# Patient Record
Sex: FEMALE | Race: WHITE | NOT HISPANIC OR LATINO | Employment: OTHER | URBAN - METROPOLITAN AREA
[De-identification: names, ages, dates, MRNs, and addresses within clinical notes are randomized per-mention and may not be internally consistent; named-entity substitution may affect disease eponyms.]

---

## 2017-05-01 DIAGNOSIS — F32.A DEPRESSION: ICD-10-CM

## 2017-05-02 NOTE — TELEPHONE ENCOUNTER
sertraline (ZOLOFT) 50 MG tablet      Last Written Prescription Date:  12/5/16  Last Fill Quantity: 30,   # refills: 0//  Last Office Visit with [facility] primary care provider:  4/8/16  Future Office visit: none    Routing refill request to provider for review/approval because:  Refill denied. Needs appointment

## 2017-07-14 ENCOUNTER — OFFICE VISIT (OUTPATIENT)
Dept: OBGYN | Facility: CLINIC | Age: 37
End: 2017-07-14
Payer: COMMERCIAL

## 2017-07-14 VITALS
WEIGHT: 146.8 LBS | DIASTOLIC BLOOD PRESSURE: 68 MMHG | BODY MASS INDEX: 22.25 KG/M2 | SYSTOLIC BLOOD PRESSURE: 108 MMHG | HEIGHT: 68 IN

## 2017-07-14 DIAGNOSIS — F41.9 ANXIETY: Primary | ICD-10-CM

## 2017-07-14 PROCEDURE — 99214 OFFICE O/P EST MOD 30 MIN: CPT | Performed by: OBSTETRICS & GYNECOLOGY

## 2017-07-14 RX ORDER — COPPER 313.4 MG/1
1 INTRAUTERINE DEVICE INTRAUTERINE ONCE
COMMUNITY
End: 2019-01-30

## 2017-07-14 ASSESSMENT — ANXIETY QUESTIONNAIRES
6. BECOMING EASILY ANNOYED OR IRRITABLE: SEVERAL DAYS
IF YOU CHECKED OFF ANY PROBLEMS ON THIS QUESTIONNAIRE, HOW DIFFICULT HAVE THESE PROBLEMS MADE IT FOR YOU TO DO YOUR WORK, TAKE CARE OF THINGS AT HOME, OR GET ALONG WITH OTHER PEOPLE: SOMEWHAT DIFFICULT
1. FEELING NERVOUS, ANXIOUS, OR ON EDGE: SEVERAL DAYS
7. FEELING AFRAID AS IF SOMETHING AWFUL MIGHT HAPPEN: SEVERAL DAYS
GAD7 TOTAL SCORE: 6
5. BEING SO RESTLESS THAT IT IS HARD TO SIT STILL: NOT AT ALL
3. WORRYING TOO MUCH ABOUT DIFFERENT THINGS: SEVERAL DAYS
2. NOT BEING ABLE TO STOP OR CONTROL WORRYING: SEVERAL DAYS

## 2017-07-14 ASSESSMENT — PATIENT HEALTH QUESTIONNAIRE - PHQ9: 5. POOR APPETITE OR OVEREATING: SEVERAL DAYS

## 2017-07-14 NOTE — MR AVS SNAPSHOT
"              After Visit Summary   2017    More Herrera    MRN: [id number]           Patient Information     Date Of Birth          1980        Visit Information        Provider Department      2017 8:20 AM Omeag Maria MD [facility] [place]        Today's Diagnoses     Anxiety    -  1       Follow-ups after your visit        Who to contact     If you have questions or need follow up information about today's clinic visit or your schedule please contact [facility] directly at [phone].  Normal or non-critical lab and imaging results will be communicated to you by [employer], letter or phone within 4 business days after the clinic has received the results. If you do not hear from us within 7 days, please contact the clinic through [employer] or phone. If you have a critical or abnormal lab result, we will notify you by phone as soon as possible.  Submit refill requests through depict or call your pharmacy and they will forward the refill request to us. Please allow 3 business days for your refill to be completed.          Additional Information About Your Visit        MyChart Information     depict lets you send messages to your doctor, view your test results, renew your prescriptions, schedule appointments and more. To sign up, go to [email or web address] . Click on \"Log in\" on the left side of the screen, which will take you to the Welcome page. Then click on \"Sign up Now\" on the right side of the page.     You will be asked to enter the access code listed below, as well as some personal information. Please follow the directions to create your username and password.     Your access code is: [email or web address]  Expires: 10/12/2017  9:31 AM     Your access code will  in 90 days. If you need help or a new code, please call your [facility] or [phone].        Care EveryWhere ID     This is your Care EveryWhere ID. This could be used by other " "organizations to access your [place] medical records  [id number]        Your Vitals Were     Height Last Period BMI (Body Mass Index)             5' 8\" (1.727 m) 06/30/2017 (Approximate) 22.32 kg/m2          Blood Pressure from Last 3 Encounters:   07/14/17 108/68   04/08/16 102/70   09/08/15 110/68    Weight from Last 3 Encounters:   07/14/17 146 lb 12.8 oz (66.6 kg)   04/08/16 153 lb (69.4 kg)   09/08/15 161 lb (73 kg)              Today, you had the following     No orders found for display         Today's Medication Changes          These changes are accurate as of: 7/14/17  9:31 AM.  If you have any questions, ask your nurse or doctor.               These medicines have changed or have updated prescriptions.        Dose/Directions    * sertraline 50 MG tablet   Commonly known as:  ZOLOFT   This may have changed:  Another medication with the same name was added. Make sure you understand how and when to take each.   Used for:  Depression   Changed by:  Omega Maria MD        TAKE 1 TO 2 TABLETS BY MOUTH DAILY.   Quantity:  30 tablet   Refills:  0       * sertraline 50 MG tablet   Commonly known as:  ZOLOFT   This may have changed:  You were already taking a medication with the same name, and this prescription was added. Make sure you understand how and when to take each.   Used for:  Anxiety   Changed by:  Omega Maria MD        Dose:  50 mg   Take 1 tablet (50 mg) by mouth daily   Quantity:  30 tablet   Refills:  1       * Notice:  This list has 2 medication(s) that are the same as other medications prescribed for you. Read the directions carefully, and ask your doctor or other care provider to review them with you.         Where to get your medicines      These medications were sent to [facility] [address] - [address] & [place] [address] [place] [address]     Phone:  [phone]     sertraline 50 MG tablet                Primary " Care Provider Office Phone # Fax #    Omega Maria [phone]       [facility] [address] [place] [address]        Equal Access to Services     JODY WAITE : Hadii aad ku hadmelvino Sonatalieali, waaxda luqadaha, qaybta kaalmada adeegyada, justus carranzalibby brown gracejenniffer fitch. So [place] [phone].    ATENCIÓN: Si habla español, tiene a williamson disposición servicios gratuitos de asistencia lingüística. Llame al [phone].    We comply with applicable federal civil rights laws and [place] laws. We do not discriminate on the basis of race, color, national origin, age, disability sex, sexual orientation or gender identity.            Thank you!     Thank you for choosing [facility]  for your care. Our goal is always to provide you with excellent care. Hearing back from our patients is one way we can continue to improve our services. Please take a few minutes to complete the written survey that you may receive in the mail after your visit with us. Thank you!             Your Updated Medication List - Protect others around you: Learn how to safely use, store and throw away your medicines at www.disposemymeds.org.          This list is accurate as of: 7/14/17  9:31 AM.  Always use your most recent med list.                   Brand Name Dispense Instructions for use Diagnosis    CONCERTA PO           paragard intrauterine copper      1 each by Intrauterine route once        PRENATAL VITAMINS PO      Take 1 capsule by mouth.        * sertraline 50 MG tablet    ZOLOFT    30 tablet    TAKE 1 TO 2 TABLETS BY MOUTH DAILY.    Depression       * sertraline 50 MG tablet    ZOLOFT    30 tablet    Take 1 tablet (50 mg) by mouth daily    Anxiety       * Notice:  This list has 2 medication(s) that are the same as other medications prescribed for you. Read the directions carefully, and ask your doctor or other care provider to review them with you.

## 2017-07-14 NOTE — PROGRESS NOTES
SUBJECTIVE:                                                   More Herrera is a 36 year old female who presents to clinic today for the following health issue(s):  Patient presents with:  Recheck Medication: here to get refill on zoloft. states she ran out about 6 months ago. states medications were helping when she needed them      HPI:  Hx of depression and anxiety in past. Has taken Zoloft in past with no side effects. Pt feeling more anxiety and difficulty concentrating. Sleeping well. Does take Ambien prn.   Most of issues seem to be coming from her 's stress. Having issues with work. He is ER MD. Sounds like a possible lawsuit.  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 Feels she wants to treat before she gets much worse.  Has taken 50-100mg in past. Usually weaned up with 25mg to reduce side effects.      Patient's last menstrual period was 2017 (approximate)..   Patient is sexually active, .  Using IUD for contraception.    reports that she has never smoked. She does not have any smokeless tobacco history on file.    STD testing offered?  Declined    Health maintenance updated:  yes    Today's PHQ-2 Score:   PHQ-2 (  [employer]) 2016   Q1: Little interest or pleasure in doing things 0   Q2: Feeling down, depressed or hopeless 0   PHQ-2 Score 0     Today's PHQ-9 Score:   PHQ-9 SCORE 2017   Total Score 5     Today's ALYSSA-7 Score:   ALYSSA-7 SCORE 2017   Total Score 6       Problem list and histories reviewed & adjusted, as indicated.  Additional history: as documented.    Patient Active Problem List   Diagnosis     Liveborn infant     Labor and delivery, indication for care     Past Surgical History:   Procedure Laterality Date     DILATION AND CURETTAGE SUCTION  2012    Procedure: DILATION AND CURETTAGE SUCTION;  VACUUM DILATION AND CURETTAGE ;  Surgeon: Omega Maria MD;  Location: [facility]     MAMMOPLASTY REDUCTION BILATERAL           "  Social History   Substance Use Topics     Smoking status: Never Smoker     Smokeless tobacco: Not on file     Alcohol use 0.5 oz/week     1 Glasses of wine per week      Comment: 1-2 drinks 2-3X per week      Problem (# of Occurrences) Relation (Name,Age of Onset)    Breast Cancer (2) Paternal Grandmother, Paternal Aunt: 4 paternal cousins at young age. Pt reports thery were BrCA gene neg.     Hyperlipidemia (3) Mother, Father, Brother            Current Outpatient Prescriptions   Medication Sig     Methylphenidate HCl (CONCERTA PO)      paragard intrauterine copper 1 each by Intrauterine route once     sertraline (ZOLOFT) 50 MG tablet Take 1 tablet (50 mg) by mouth daily     sertraline (ZOLOFT) 50 MG tablet TAKE 1 TO 2 TABLETS BY MOUTH DAILY.     PRENATAL VITAMINS PO Take 1 capsule by mouth.     No current facility-administered medications for this visit.      Facility-Administered Medications Ordered in Other Visits   Medication     ROPivacaine (NAROPIN) injection     No Known Allergies    ROS:  12 point review of systems negative other than symptoms noted below.  Psychiatric: feeling off    OBJECTIVE:     /68  Ht 5' 8\" (1.727 m)  Wt 146 lb 12.8 oz (66.6 kg)  LMP 06/30/2017 (Approximate)  BMI 22.32 kg/m2  Body mass index is 22.32 kg/(m^2).    Exam:  Constitutional:  Appearance: Well nourished, well developed alert, in no acute distress  Neurologic/Psychiatric:  Mental Status:  Oriented X3      In-Clinic Test Results:  No results found for this or any previous visit (from the past 24 hour(s)).    ASSESSMENT/PLAN:                                                        ICD-10-CM    1. Anxiety F41.9 sertraline (ZOLOFT) 50 MG tablet         Discussed sleep and use of OTC meds as well as Ambien.  Discussed Zoloft and expected outcomes. Won't change her life, just how she reacts to it. Her symptoms are mostly anxiety so 50mg should help. Gave 60 pills. Will call for refill and do PHQ-9 on phone.  Discussed " side effects. May wean off end of summer if things get better at home.    25 minutes was spent face to face with the patient today discussing her history, diagnosis, and follow-up plan as noted above. Over 50% of the visit was spent in counseling and coordination of care.    Total Visit Time: 25 minutes.       Omega Maria MD  [facility]

## 2017-07-15 ASSESSMENT — PATIENT HEALTH QUESTIONNAIRE - PHQ9: SUM OF ALL RESPONSES TO PHQ QUESTIONS 1-9: 5

## 2017-07-15 ASSESSMENT — ANXIETY QUESTIONNAIRES: GAD7 TOTAL SCORE: 6

## 2017-09-23 ENCOUNTER — HEALTH MAINTENANCE LETTER (OUTPATIENT)
Age: 37
End: 2017-09-23

## 2017-10-05 ENCOUNTER — TRANSFERRED RECORDS (OUTPATIENT)
Dept: HEALTH INFORMATION MANAGEMENT | Facility: CLINIC | Age: 37
End: 2017-10-05

## 2018-01-25 DIAGNOSIS — F41.9 ANXIETY: ICD-10-CM

## 2018-01-25 NOTE — TELEPHONE ENCOUNTER
"SERTRALINE 50      Last Written Prescription Date:  9/26/17  Last Fill Quantity: 180,   # refills: 0  Last Office Visit: 9/8/15-PP VISIT, 4/8/16-pre-op, 7/14/17-anxiety appt med check  Future Office visit:       POC note 7/14/17: \"Discussed Zoloft and expected outcomes. Won't change her life, just how she reacts to it. Her symptoms are mostly anxiety so 50mg should help. Gave 60 pills. Will call for refill and do PHQ-9 on phone.\"  Refill encounter 9/25/17: \"Note routed to Dr. Maria ok to increase dose for pt? How much for refill? Follow up needed?- no notes about when pt would be due for follow up for this medication and her dose was increased at that time from 50mg-75mg.       LMTCB to discuss how increased dose is working for her and to perform GAD7 and PHQ9.   "

## 2018-01-31 NOTE — TELEPHONE ENCOUNTER
Received call from [employer]. Informed we need to talk to pt prior to sending refill. Called pt and LMTCB so that PHQ-9 and ALYSSA-7 could be done. Also disscuss what dose she is taking.

## 2018-12-19 ENCOUNTER — OFFICE VISIT (OUTPATIENT)
Dept: OBGYN | Facility: CLINIC | Age: 38
End: 2018-12-19
Payer: COMMERCIAL

## 2018-12-19 VITALS
WEIGHT: 146 LBS | DIASTOLIC BLOOD PRESSURE: 70 MMHG | HEART RATE: 72 BPM | BODY MASS INDEX: 22.13 KG/M2 | SYSTOLIC BLOOD PRESSURE: 134 MMHG | HEIGHT: 68 IN

## 2018-12-19 DIAGNOSIS — Z71.1 PERSON WITH FEARED COMPLAINT IN WHOM NO DIAGNOSIS WAS MADE: Primary | ICD-10-CM

## 2018-12-19 PROCEDURE — 99212 OFFICE O/P EST SF 10 MIN: CPT | Performed by: NURSE PRACTITIONER

## 2018-12-19 ASSESSMENT — MIFFLIN-ST. JEOR: SCORE: 1390.75

## 2018-12-19 NOTE — PROGRESS NOTES
SUBJECTIVE:                                                   More Herrera is a 38 year old female who presents to clinic today for the following health issue(s):  Patient presents with:  Rectal Problem: c/o possible hemorrhoid or skin tag external w/ some rectal bleeding; has had one internally      HPI:  Pt here today for evaluation of a possible hemorrhoid or skin tag. She is not having any symptoms at this time.     Pt states that with the birth of her last child, in , she pushed for 4 hours and tore. She did have stiches.     She has not had an annual exam since her last pregnancy. Family hx of breast cancer on her fathers side. Last pap in , NIL.    Patient's last menstrual period was 2018 (approximate)..   Patient is sexually active, .  Using IUD for contraception.    reports that  has never smoked. she has never used smokeless tobacco.    STD testing offered?  Declined    Health maintenance updated:  yes      Problem list and histories reviewed & adjusted, as indicated.  Additional history: as documented.    Patient Active Problem List   Diagnosis     Liveborn infant     Labor and delivery, indication for care     Past Surgical History:   Procedure Laterality Date     DILATION AND CURETTAGE SUCTION  2012    Procedure: DILATION AND CURETTAGE SUCTION;  VACUUM DILATION AND CURETTAGE ;  Surgeon: Omega Maria MD;  Location: [facility]     MAMMOPLASTY REDUCTION BILATERAL            Social History     Tobacco Use     Smoking status: Never Smoker     Smokeless tobacco: Never Used   Substance Use Topics     Alcohol use: Yes     Alcohol/week: 0.5 oz     Types: 1 Glasses of wine per week     Comment: 1-2 drinks 2-3X per week      Problem (# of Occurrences) Relation (Name,Age of Onset)    Breast Cancer (2) Paternal Grandmother, Paternal Aunt: 4 paternal cousins at young age. Pt reports thery were BrCA gene neg.     Hyperlipidemia (3) Mother, Father, Brother         "    Current Outpatient Medications   Medication Sig     Methylphenidate HCl (CONCERTA PO) Take 27 mg by mouth      paragard intrauterine copper 1 each by Intrauterine route once     PRENATAL VITAMINS PO Take 1 capsule by mouth.     sertraline (ZOLOFT) 50 MG tablet TAKE 1 TO 2 TABLETS BY MOUTH DAILY.     No current facility-administered medications for this visit.      Facility-Administered Medications Ordered in Other Visits   Medication     ROPivacaine (NAROPIN) injection     No Known Allergies    ROS:  12 point review of systems negative other than symptoms noted below.    OBJECTIVE:     /70   Pulse 72   Ht 1.727 m (5' 8\")   Wt 66.2 kg (146 lb)   LMP 12/14/2018 (Approximate)   BMI 22.20 kg/m    Body mass index is 22.2 kg/m .    Exam:  Constitutional:  Appearance: Well nourished, well developed alert, in no acute distress  Neurologic/Psychiatric:  Mental Status:  Oriented X3   Pelvic Exam:  External Genitalia:     Normal appearance for age, no discharge present, no tenderness present, no inflammatory lesions present, color normal  Anus:     Anus within normal limits, no hemorrhoids present- small 4mm extra skin at anus. No fissures present.  Inguinal Lymph Nodes:     No lymphadenopathy present  Pubic Hair:     Normal pubic hair distribution for age  Genitalia and Groin:     No rashes present, no lesions present, no areas of discoloration, no masses present       In-Clinic Test Results:  No results found for this or any previous visit (from the past 24 hour(s)).    ASSESSMENT/PLAN:                                                        ICD-10-CM    1. Person with feared complaint in whom no diagnosis was made Z71.1        There are no Patient Instructions on file for this visit.    Reassurance of finding. Recommended pt make appt with MI for annual/mammogram and come fasting if he decides to do fasting labs.     JACKELYN Wu [facility]  "

## 2018-12-21 ENCOUNTER — OFFICE VISIT (OUTPATIENT)
Dept: OBGYN | Facility: CLINIC | Age: 38
End: 2018-12-21
Payer: COMMERCIAL

## 2018-12-21 ENCOUNTER — TELEPHONE (OUTPATIENT)
Dept: OBGYN | Facility: CLINIC | Age: 38
End: 2018-12-21

## 2018-12-21 ENCOUNTER — RESULT FOLLOW UP (OUTPATIENT)
Dept: OBGYN | Facility: CLINIC | Age: 38
End: 2018-12-21

## 2018-12-21 VITALS
WEIGHT: 145 LBS | SYSTOLIC BLOOD PRESSURE: 134 MMHG | HEIGHT: 68 IN | BODY MASS INDEX: 21.98 KG/M2 | DIASTOLIC BLOOD PRESSURE: 72 MMHG

## 2018-12-21 DIAGNOSIS — Z01.419 GYNECOLOGIC EXAM NORMAL: Primary | ICD-10-CM

## 2018-12-21 DIAGNOSIS — Z11.3 SCREEN FOR STD (SEXUALLY TRANSMITTED DISEASE): ICD-10-CM

## 2018-12-21 DIAGNOSIS — Z11.8 SCREENING FOR CHLAMYDIAL DISEASE: ICD-10-CM

## 2018-12-21 DIAGNOSIS — R87.810 CERVICAL HIGH RISK HPV (HUMAN PAPILLOMAVIRUS) TEST POSITIVE: ICD-10-CM

## 2018-12-21 PROCEDURE — G0145 SCR C/V CYTO,THINLAYER,RESCR: HCPCS | Performed by: OBSTETRICS & GYNECOLOGY

## 2018-12-21 PROCEDURE — 99213 OFFICE O/P EST LOW 20 MIN: CPT | Performed by: OBSTETRICS & GYNECOLOGY

## 2018-12-21 PROCEDURE — 87491 CHLMYD TRACH DNA AMP PROBE: CPT | Performed by: OBSTETRICS & GYNECOLOGY

## 2018-12-21 PROCEDURE — 87624 HPV HI-RISK TYP POOLED RSLT: CPT | Performed by: OBSTETRICS & GYNECOLOGY

## 2018-12-21 PROCEDURE — 87591 N.GONORRHOEAE DNA AMP PROB: CPT | Performed by: OBSTETRICS & GYNECOLOGY

## 2018-12-21 ASSESSMENT — MIFFLIN-ST. JEOR: SCORE: 1386.22

## 2018-12-21 NOTE — PROGRESS NOTES
SUBJECTIVE:                                                   More Herrera is a 38 year old female who presents to clinic today for the following health issue(s):  No chief complaint on file.      Additional information:     HPI:  Here for STD testing. Had sexual encounter this past week with another partner from work. Wanting testing to be sure she's ok.   unaware.  Due for pap and pelvic    Patient's last menstrual period was 2018 (approximate)..   Patient is sexually active, .  Using IUD for contraception.    reports that  has never smoked. she has never used smokeless tobacco.    STD testing offered?  Accepted    Health maintenance updated:  Due for pap    Today's PHQ-2 Score:   PHQ-2 (  [employer]) 2016   Q1: Little interest or pleasure in doing things 0   Q2: Feeling down, depressed or hopeless 0   PHQ-2 Score 0     Today's PHQ-9 Score:   PHQ-9 SCORE 2017   PHQ-9 Total Score 8     Today's ALYSSA-7 Score:   ALYSSA-7 SCORE 2017   Total Score 7       Problem list and histories reviewed & adjusted, as indicated.  Additional history: as documented.    Patient Active Problem List   Diagnosis     Liveborn infant     Labor and delivery, indication for care     Past Surgical History:   Procedure Laterality Date     DILATION AND CURETTAGE SUCTION  2012    Procedure: DILATION AND CURETTAGE SUCTION;  VACUUM DILATION AND CURETTAGE ;  Surgeon: Omega Maria MD;  Location: [facility]     MAMMOPLASTY REDUCTION BILATERAL            Social History     Tobacco Use     Smoking status: Never Smoker     Smokeless tobacco: Never Used   Substance Use Topics     Alcohol use: Yes     Alcohol/week: 0.5 oz     Types: 1 Glasses of wine per week     Comment: 1-2 drinks 2-3X per week      Problem (# of Occurrences) Relation (Name,Age of Onset)    Breast Cancer (2) Paternal Grandmother, Paternal Aunt: 4 paternal cousins at young age. Pt reports thery were BrCA gene neg.     Hyperlipidemia  "(3) Mother, Father, Brother            Current Outpatient Medications   Medication Sig     Methylphenidate HCl (CONCERTA PO) Take 27 mg by mouth      paragard intrauterine copper 1 each by Intrauterine route once     PRENATAL VITAMINS PO Take 1 capsule by mouth.     sertraline (ZOLOFT) 50 MG tablet TAKE 1 TO 2 TABLETS BY MOUTH DAILY.     No current facility-administered medications for this visit.      Facility-Administered Medications Ordered in Other Visits   Medication     ROPivacaine (NAROPIN) injection     No Known Allergies    ROS:  12 point review of systems negative other than symptoms noted below.    OBJECTIVE:     /72   Ht 1.727 m (5' 8\")   Wt 65.8 kg (145 lb)   LMP 12/14/2018 (Approximate)   BMI 22.05 kg/m    Body mass index is 22.05 kg/m .    Exam:  Constitutional:  Appearance: Well nourished, well developed alert, in no acute distress  Neurologic/Psychiatric:  Mental Status:  Oriented X3   Pelvic Exam:  External Genitalia:     Normal appearance for age, no discharge present, no tenderness present, no inflammatory lesions present, color normal  Vagina:     Normal vaginal vault without central or paravaginal defects, no discharge present, no inflammatory lesions present, no masses present  Bladder:     Nontender to palpation  Urethra:   Urethral Body:  Urethra palpation normal, urethra structural support normal   Urethral Meatus:  No erythema or lesions present  Cervix:     Appearance healthy, no lesions present, nontender to palpation, no bleeding present  Uterus:     Uterus: firm, normal sized and nontender, anteverted in position.   Adnexa:     No adnexal tenderness present, no adnexal masses present  Perineum:     Perineum within normal limits, no evidence of trauma, no rashes or skin lesions present  Anus:     Anus within normal limits, no hemorrhoids present  Inguinal Lymph Nodes:     No lymphadenopathy present  Pubic Hair:     Normal pubic hair distribution for age  Genitalia and Groin:  "    No rashes present, no lesions present, no areas of discoloration, no masses present       In-Clinic Test Results:  No results found for this or any previous visit (from the past 24 hour(s)).    ASSESSMENT/PLAN:                                                        ICD-10-CM    1. Gynecologic exam normal Z01.419 Pap imaged thin layer screen with HPV - recommended age 30 - 65     HPV High Risk Types DNA Cervical   2. Screen for STD (sexually transmitted disease) Z11.3 NEISSERIA GONORRHOEA PCR     CANCELED: NEISSERIA GONORRHOEA PCR   3. Screening for chlamydial disease Z11.8 CHLAMYDIA TRACHOMATIS PCR       Will do pap and GC/Chlamydia today. [facility].    Omega Maria MD  [facility] [place]

## 2018-12-21 NOTE — LETTER
December 5, 2019      More Erickson Sharon  [address] [address]    Dear ,      At [facility], your health and wellness is our primary concern. That is why we are following up on a positive high risk HPV test from 12/21/18. Your provider had recommended that you have a Pap smear and HPV test completed by 12/21/19. Our records do not show that this has been scheduled.    It is important to complete the follow up that your provider has suggested for you to ensure that there are no worsening changes which may, over time, develop into cancer.      Please contact our office at  [phone] to schedule an appointment for a Pap smear and HPV test at your earliest convenience. If you have questions or concerns, please call the clinic and we will be happy to assist you.    If you have completed the tests outside of [facility], please have the results forwarded to our office. We will update the chart for your primary Physician to review before your next annual physical.     Thank you for choosing [facility]!    Sincerely,      Your [facility] Care Team//[facility]

## 2018-12-21 NOTE — TELEPHONE ENCOUNTER
Pt was just seen by MI shortly ago  Pt inquiring if a gonorrhea can be added to testing  Confirmed with pt that it has already been ordered and pending.  Pt did not realize that and was thankful. No further questions.

## 2018-12-23 LAB
C TRACH DNA SPEC QL NAA+PROBE: NEGATIVE
N GONORRHOEA DNA SPEC QL NAA+PROBE: NEGATIVE
SPECIMEN SOURCE: NORMAL
SPECIMEN SOURCE: NORMAL

## 2018-12-26 LAB
COPATH REPORT: NORMAL
PAP: NORMAL

## 2018-12-28 ENCOUNTER — MYC MEDICAL ADVICE (OUTPATIENT)
Dept: OBGYN | Facility: CLINIC | Age: 38
End: 2018-12-28

## 2018-12-28 LAB
FINAL DIAGNOSIS: ABNORMAL
HPV HR 12 DNA CVX QL NAA+PROBE: POSITIVE
HPV16 DNA SPEC QL NAA+PROBE: NEGATIVE
HPV18 DNA SPEC QL NAA+PROBE: NEGATIVE
SPECIMEN DESCRIPTION: ABNORMAL
SPECIMEN SOURCE CVX/VAG CYTO: ABNORMAL

## 2018-12-28 NOTE — PROGRESS NOTES
12/21/18 NIL pap, + HR HPV (not 16/18).  Plan: cotest in 1 year  12/28/18 RN LM for pt to return call. Results read by pt in [facility] (kisha)  12/31/18 LM for pt to call back if any further questions. Pt returned the call and I talked with her about these results and recommendations (kisha) (sk)  12/05/19 [facility] cotest reminder message sent. ([facility])  01/03/20 [facility] clinic and schedule. ([facility])

## 2018-12-31 ENCOUNTER — TELEPHONE (OUTPATIENT)
Dept: OBGYN | Facility: CLINIC | Age: 38
End: 2018-12-31

## 2018-12-31 NOTE — TELEPHONE ENCOUNTER
The pt called and said that she viewed her [facility] message. I went over her pap and HPV results with her and provided this education below. I also sent this to her via [employer].     HPV education provided:     HPV is the name of a group of viruses.  There are many types of HPV, some that are very benign (low risk), and some that cause genital warts and some high risk types that have the potential to cause cervical cancer if not monitored appropriately. HPV is one of the most common sexually transmitted infections in the [place].  HPV is transmitted by skin-to-skin contact and not through body fluids.  Most infections cause no problems or symptoms and can go away on their own without treatment.   There is currently no actual curative treatment for HPV;  but the body's immune system can clear the infection.     How to Prevent HPV and other STIs (sexually transmitted infections) and their complications:    *   Using condoms can reduce your risk of getting and transmitting STIs.    *   Have just one sexual partner who is not sexually active with anyone else.    *   Avoid smoking.  Studies show that smoking increases the risk of olivia HPV.    *   Follow up with your provider as recommended, including follow up pap smears and pelvic exams.    *  Ways to boost the immune system: regular exercise, managing stress, eating a healthy diet, adequate rest, and daily multivitamin use.    The following web sites provide accurate information on the items we discussed.    The Center for Disease Control (CDC): www.CDC.gov   The American College of Obstetricians and Gynecologists:  https://www.acog.org/Patients/FAQs/Cervical-Cancer-Screening    Omaira Corral RN-Pap Tracking

## 2019-01-16 ENCOUNTER — OFFICE VISIT (OUTPATIENT)
Dept: OBGYN | Facility: CLINIC | Age: 39
End: 2019-01-16
Payer: COMMERCIAL

## 2019-01-16 VITALS
BODY MASS INDEX: 21.82 KG/M2 | DIASTOLIC BLOOD PRESSURE: 80 MMHG | SYSTOLIC BLOOD PRESSURE: 122 MMHG | HEIGHT: 68 IN | WEIGHT: 144 LBS

## 2019-01-16 DIAGNOSIS — B37.31 YEAST VAGINITIS: Primary | ICD-10-CM

## 2019-01-16 PROCEDURE — 87186 SC STD MICRODIL/AGAR DIL: CPT | Performed by: OBSTETRICS & GYNECOLOGY

## 2019-01-16 PROCEDURE — 99213 OFFICE O/P EST LOW 20 MIN: CPT | Performed by: OBSTETRICS & GYNECOLOGY

## 2019-01-16 PROCEDURE — 87102 FUNGUS ISOLATION CULTURE: CPT | Performed by: OBSTETRICS & GYNECOLOGY

## 2019-01-16 PROCEDURE — 87653 STREP B DNA AMP PROBE: CPT | Performed by: OBSTETRICS & GYNECOLOGY

## 2019-01-16 RX ORDER — FLUCONAZOLE 150 MG/1
150 TABLET ORAL
Qty: 4 TABLET | Refills: 0 | Status: SHIPPED | OUTPATIENT
Start: 2019-01-16 | End: 2019-01-30

## 2019-01-16 RX ORDER — TERCONAZOLE 8 MG/G
1 CREAM VAGINAL AT BEDTIME
Qty: 35 G | Refills: 0 | Status: SHIPPED | OUTPATIENT
Start: 2019-01-16 | End: 2019-01-23

## 2019-01-16 ASSESSMENT — MIFFLIN-ST. JEOR: SCORE: 1381.68

## 2019-01-16 NOTE — PROGRESS NOTES
SUBJECTIVE:                                                   More Herrera is a 38 year old female who presents to clinic today for the following health issue(s):  Patient presents with:  Vaginal Problem: patient was treated for BV using Virtu-Well and finished antibiotic a week ago and now is wondering if she has a yeast infection, c/o itching.      Additional information: patient is traveling out of the country tomorrow    HPI:  Patient is seen for vaginitis.  Patient noted discharge with itching.  She had taken 1 Diflucan but also been on antibiotics.  She is leaving out of the country tomorrow.  She has not had a history of frequent urinary tract infections.    No LMP recorded..   Patient is sexually active, .  Using IUD for contraception.    reports that  has never smoked. she has never used smokeless tobacco.  STD testing offered?  Declined  Health maintenance updated:  yes    Today's PHQ-2 Score:   PHQ-2 (  [employer]) 2016   Q1: Little interest or pleasure in doing things 0   Q2: Feeling down, depressed or hopeless 0   PHQ-2 Score 0     Today's PHQ-9 Score:   PHQ-9 SCORE 2017   PHQ-9 Total Score 8     Today's ALYSSA-7 Score:   ALYSSA-7 SCORE 2017   Total Score 7       Problem list and histories reviewed & adjusted, as indicated.  Additional history: as documented.    Patient Active Problem List   Diagnosis     Liveborn infant     Labor and delivery, indication for care     Cervical high risk HPV (human papillomavirus) test positive     Past Surgical History:   Procedure Laterality Date     DILATION AND CURETTAGE SUCTION  2012    Procedure: DILATION AND CURETTAGE SUCTION;  VACUUM DILATION AND CURETTAGE ;  Surgeon: Omega Maria MD;  Location: [facility]     MAMMOPLASTY REDUCTION BILATERAL            Social History     Tobacco Use     Smoking status: Never Smoker     Smokeless tobacco: Never Used   Substance Use Topics     Alcohol use: Yes     Alcohol/week: 0.5 oz      "Types: 1 Glasses of wine per week     Comment: 1-2 drinks 2-3X per week      Problem (# of Occurrences) Relation (Name,Age of Onset)    Breast Cancer (2) Paternal Grandmother, Paternal Aunt: 4 paternal cousins at young age. Pt reports thery were BrCA gene neg.     Hyperlipidemia (3) Mother, Father, Brother            Current Outpatient Medications   Medication Sig     fluconazole (DIFLUCAN) 150 MG tablet Take 1 tablet (150 mg) by mouth every 3 days     Methylphenidate HCl (CONCERTA PO) Take 27 mg by mouth      paragard intrauterine copper 1 each by Intrauterine route once     PRENATAL VITAMINS PO Take 1 capsule by mouth.     sertraline (ZOLOFT) 50 MG tablet TAKE 1 TO 2 TABLETS BY MOUTH DAILY.     terconazole (TERAZOL 3) 0.8 % vaginal cream Place 1 applicator (5 g) vaginally At Bedtime for 7 days     No current facility-administered medications for this visit.      Facility-Administered Medications Ordered in Other Visits   Medication     ROPivacaine (NAROPIN) injection     No Known Allergies    ROS:  12 point review of systems negative other than symptoms noted below.    OBJECTIVE:     /80   Ht 1.727 m (5' 8\")   Wt 65.3 kg (144 lb)   BMI 21.90 kg/m    Body mass index is 21.9 kg/m .    Exam:  Constitutional:  Appearance: Well nourished, well developed alert, in no acute distress  Chest:  Respiratory Effort:  Breathing unlabored  Cardiovascular: Heart: Auscultation:  Regular rate, normal rhythm, no murmurs present  Skin:General Inspection:  No rashes present, no lesions present, no areas of discoloration; Genitalia and Groin:  No rashes present, no lesions present, no areas of discoloration, no masses present.  Neurologic/Psychiatric:  Mental Status:  Oriented X3   Pelvic Exam:  External Genitalia:  Reddened     Vagina:     Thick white discharge consistent with yeast  Bladder:     Nontender to palpation  Urethra:   Urethral Body:  Urethra palpation normal, urethra structural support normal   Urethral Meatus:  " No erythema or lesions present  Cervix:     Appearance healthy, no lesions present, nontender to palpation, no bleeding present  Perineum:     Perineum within normal limits, no evidence of trauma, no rashes or skin lesions present  Anus:     Anus within normal limits, no hemorrhoids present  Inguinal Lymph Nodes:     No lymphadenopathy present  Pubic Hair:     Normal pubic hair distribution for age  Genitalia and Groin:     No rashes present, no lesions present, no areas of discoloration, no masses present       In-Clinic Test Results:  No results found for this or any previous visit (from the past 24 hour(s)).    ASSESSMENT/PLAN:                                                        ICD-10-CM    1. Yeast vaginitis B37.3 fluconazole (DIFLUCAN) 150 MG tablet     terconazole (TERAZOL 3) 0.8 % vaginal cream     Group B strep PCR     Wet prep     Yeast culture           Plan: The patient will be notified as the results of the culture.  She will be in [place] for 8 days.    Elio Berrios MD  [facility]

## 2019-01-17 LAB
GP B STREP DNA SPEC QL NAA+PROBE: POSITIVE
SPECIMEN SOURCE: ABNORMAL

## 2019-01-20 LAB
BACTERIA SPEC CULT: ABNORMAL
SPECIMEN SOURCE: ABNORMAL

## 2019-01-21 LAB
Lab: NORMAL
SPECIMEN SOURCE: NORMAL
YEAST SPEC QL CULT: NORMAL

## 2019-01-24 ENCOUNTER — TELEPHONE (OUTPATIENT)
Dept: OBGYN | Facility: CLINIC | Age: 39
End: 2019-01-24

## 2019-01-24 NOTE — TELEPHONE ENCOUNTER
She is on amoxicillin and diflucan right now, I thinks she needs to give the weekend and finish these meds if persist be seen on Monday.  KHAI FriendC

## 2019-01-24 NOTE — TELEPHONE ENCOUNTER
Called and left detailed vm with VASHTI Lim's response below. Encouraged to call and speak with triage with any questions.

## 2019-01-24 NOTE — TELEPHONE ENCOUNTER
Pt calling from [facility], returning to the [facility] tomorrow  Recently seen for a reoccurring yeast infection 1/16/19 with YESENIA  [place] like it was improving until she had IC  After IC experienced a vaginal discharge with an odor to it. Experiencing a little itching and discomfort too.    Question: is it normal to experience these sx's or does she now have BV and need a new tx?    Will consult GEORGI Lim NP for her recommendations and call pt back on cell number (no time difference where she is).

## 2019-01-25 ENCOUNTER — OFFICE VISIT (OUTPATIENT)
Dept: OBGYN | Facility: CLINIC | Age: 39
End: 2019-01-25
Payer: COMMERCIAL

## 2019-01-25 ENCOUNTER — ANCILLARY PROCEDURE (OUTPATIENT)
Dept: ULTRASOUND IMAGING | Facility: CLINIC | Age: 39
End: 2019-01-25
Payer: COMMERCIAL

## 2019-01-25 DIAGNOSIS — N76.1 SUBACUTE VAGINITIS: ICD-10-CM

## 2019-01-25 DIAGNOSIS — Z30.431 CHECKING OF INTRAUTERINE DEVICE: ICD-10-CM

## 2019-01-25 DIAGNOSIS — Z30.431 CHECKING OF INTRAUTERINE DEVICE: Primary | ICD-10-CM

## 2019-01-25 PROCEDURE — 99214 OFFICE O/P EST MOD 30 MIN: CPT | Performed by: OBSTETRICS & GYNECOLOGY

## 2019-01-25 PROCEDURE — 76830 TRANSVAGINAL US NON-OB: CPT | Performed by: OBSTETRICS & GYNECOLOGY

## 2019-01-25 NOTE — PROGRESS NOTES
SUBJECTIVE:                                                   More Herrera is a 38 year old female who presents to clinic today for the following health issue(s):  Patient presents with:  Vaginal Problem: patient has been having reoccuring vaginal symptoms. Is currently using amoxicillin and diflucan.       Additional information: patient was notified while traveling that her recent culture came back with Strep B. Patient was just concerned that her current discharge is just not right and has more odor and noticeable with intercourse. Also patient would like to know if IUD is proper place, feels the strings are lower.    HPI:  Still has amoxicillin to finish. Did take a diflucan. Feels irritation. Feels like something isn't right. Also felt IUD strings. Never felt before. Having a little cramping. No spotting. Concerned it moved.      No LMP recorded..   Patient is sexually active, .  Using Paragard IUD for contraception.    reports that  has never smoked. she has never used smokeless tobacco.  STD testing offered?  Declined  Health maintenance updated:  yes    Today's PHQ-2 Score:   PHQ-2 (  [employer]) 2016   Q1: Little interest or pleasure in doing things 0   Q2: Feeling down, depressed or hopeless 0   PHQ-2 Score 0     Today's PHQ-9 Score:   PHQ-9 SCORE 2017   PHQ-9 Total Score 8     Today's ALYSSA-7 Score:   ALYSSA-7 SCORE 2017   Total Score 7       Problem list and histories reviewed & adjusted, as indicated.  Additional history: as documented.    Patient Active Problem List   Diagnosis     Liveborn infant     Labor and delivery, indication for care     Cervical high risk HPV (human papillomavirus) test positive     Past Surgical History:   Procedure Laterality Date     DILATION AND CURETTAGE SUCTION  2012    Procedure: DILATION AND CURETTAGE SUCTION;  VACUUM DILATION AND CURETTAGE ;  Surgeon: Omega Maria MD;  Location: [facility]     MAMMOPLASTY REDUCTION BILATERAL       2003      Social History     Tobacco Use     Smoking status: Never Smoker     Smokeless tobacco: Never Used   Substance Use Topics     Alcohol use: Yes     Alcohol/week: 0.5 oz     Types: 1 Glasses of wine per week     Comment: 1-2 drinks 2-3X per week      Problem (# of Occurrences) Relation (Name,Age of Onset)    Breast Cancer (2) Paternal Grandmother, Paternal Aunt: 4 paternal cousins at young age. Pt reports thery were BrCA gene neg.     Hyperlipidemia (3) Mother, Father, Brother            Current Outpatient Medications   Medication Sig     fluconazole (DIFLUCAN) 150 MG tablet Take 1 tablet (150 mg) by mouth every 3 days     Methylphenidate HCl (CONCERTA PO) Take 27 mg by mouth      paragard intrauterine copper 1 each by Intrauterine route once     PRENATAL VITAMINS PO Take 1 capsule by mouth.     sertraline (ZOLOFT) 50 MG tablet TAKE 1 TO 2 TABLETS BY MOUTH DAILY.     No current facility-administered medications for this visit.      Facility-Administered Medications Ordered in Other Visits   Medication     ROPivacaine (NAROPIN) injection     No Known Allergies    ROS:  12 point review of systems negative other than symptoms noted below.    OBJECTIVE:     There were no vitals taken for this visit.  There is no height or weight on file to calculate BMI.    Exam:  Constitutional:  Appearance: Well nourished, well developed alert, in no acute distress  Neurologic/Psychiatric:  Mental Status:  Oriented X3   Pelvic Exam:  External Genitalia:     Normal appearance for age, no discharge present, no tenderness present, no inflammatory lesions present, color normal  Vagina:    Normal vaginal vault without central or paravaginal defects, no discharge present, no inflammatory lesions present, no masses present  Bladder:     Nontender to palpation  Urethra:   Urethral Body:  Urethra palpation normal, urethra structural support normal   Urethral Meatus:  No erythema or lesions present  Cervix:     Appearance healthy, no  lesions present, nontender to palpation, no bleeding present, string present  Perineum:     Perineum within normal limits, no evidence of trauma, no rashes or skin lesions present  Anus:     Anus within normal limits, no hemorrhoids present    Pubic Hair:     Normal pubic hair distribution for age  Genitalia and Groin:     No rashes present, no lesions present, no areas of discoloration, no masses present       In-Clinic Test Results:  WET PREP: NO CLUE CELLS, NO TRICH, NO YEAST    ASSESSMENT/PLAN:                                                        ICD-10-CM    1. Checking of intrauterine device Z30.431 US Transvaginal Non OB   2. Subacute vaginitis N76.1        No evidence of infection. Finish amoxicillin. No need for repeat diflucan.   U/S today for IUD position.     U/S shows IUD near fundus. Uterus arcuate in shape.   Strings trimmed since they appear to be slightly long.      Omega Maria MD  [facility] [place]

## 2019-01-30 ENCOUNTER — TELEPHONE (OUTPATIENT)
Dept: OBGYN | Facility: CLINIC | Age: 39
End: 2019-01-30

## 2019-01-30 ENCOUNTER — OFFICE VISIT (OUTPATIENT)
Dept: MIDWIFE SERVICES | Facility: CLINIC | Age: 39
End: 2019-01-30
Payer: COMMERCIAL

## 2019-01-30 VITALS
SYSTOLIC BLOOD PRESSURE: 118 MMHG | BODY MASS INDEX: 21.82 KG/M2 | HEIGHT: 68 IN | WEIGHT: 144 LBS | DIASTOLIC BLOOD PRESSURE: 60 MMHG

## 2019-01-30 DIAGNOSIS — N89.8 VAGINAL DISCHARGE: ICD-10-CM

## 2019-01-30 DIAGNOSIS — Z30.432 ENCOUNTER FOR REMOVAL OF INTRAUTERINE CONTRACEPTIVE DEVICE: Primary | ICD-10-CM

## 2019-01-30 DIAGNOSIS — N89.8 VAGINAL ODOR: ICD-10-CM

## 2019-01-30 LAB
SPECIMEN SOURCE: NORMAL
WET PREP SPEC: NORMAL

## 2019-01-30 PROCEDURE — 99214 OFFICE O/P EST MOD 30 MIN: CPT | Mod: 25 | Performed by: ADVANCED PRACTICE MIDWIFE

## 2019-01-30 PROCEDURE — 87109 MYCOPLASMA: CPT | Performed by: ADVANCED PRACTICE MIDWIFE

## 2019-01-30 PROCEDURE — 58301 REMOVE INTRAUTERINE DEVICE: CPT | Performed by: ADVANCED PRACTICE MIDWIFE

## 2019-01-30 PROCEDURE — 87653 STREP B DNA AMP PROBE: CPT | Performed by: ADVANCED PRACTICE MIDWIFE

## 2019-01-30 PROCEDURE — 87205 SMEAR GRAM STAIN: CPT | Performed by: ADVANCED PRACTICE MIDWIFE

## 2019-01-30 PROCEDURE — 87186 SC STD MICRODIL/AGAR DIL: CPT | Performed by: ADVANCED PRACTICE MIDWIFE

## 2019-01-30 PROCEDURE — 87210 SMEAR WET MOUNT SALINE/INK: CPT | Performed by: ADVANCED PRACTICE MIDWIFE

## 2019-01-30 RX ORDER — FLUCONAZOLE 150 MG/1
150 TABLET ORAL ONCE
Qty: 2 TABLET | Refills: 0 | Status: SHIPPED | OUTPATIENT
Start: 2019-01-30 | End: 2019-01-30

## 2019-01-30 ASSESSMENT — MIFFLIN-ST. JEOR: SCORE: 1381.68

## 2019-01-30 NOTE — PROGRESS NOTES
"  IUD Removal:  SUBJECTIVE:  More Herrera \"Meghan\" is a 38 year old female,, No LMP recorded. who presents today for IUD removal.     Her current IUD was placed 3.5 years ago. This is her 3rd or 4th Paragard IUD as far as she can remember. She was seen on Friday by Dr. Maria and had US to confirm IUD placement but she continues to feel like strings are longer than normal and something is \"off.\" She would like the IUD removed today despite his counseling and my counseling that IUD is placed correctly just below arcuate fundus. Again reviewed findings with patient and she desires removal. She would also like replacement. See below for counseling. She has had problems including excessive vaginal discharge with the IUD. She requests removal of the IUD because of problems stated above    Is a pregnancy test required: No.  Was a consent obtained?  Yes      PROCEDURE:    A speculum exam was performed and the cervix was visualized. The IUD string was visualized. Using ring forceps, the string  was grasped and the IUD removed intact.    POST PROCEDURE:    The patient tolerated the procedure well. Patient was discharged in stable condition.    Call if bleeding, pain or fever occur., Birth control counseling given., Reinsertion of IUD scheduled. and Use of condoms/foam discussed.      SUBJECTIVE:   More is a 38 year old here for vaginal symptoms and discussion of replacing IUD.        Vaginal Symptoms     Onset: on and off for a \"long time\"    Description:  Vaginal Discharge: white, creamy and thick  Itching (Pruritis): No  Burning sensation:  No  Odor:  Yes: fishy  Irritation:  Yes-occasionally but not today    Accompanying Signs & Symptoms:  Pain with Urination: No  Abdominal Pain:  No  Fever: No   History:   Sexually active:  Yes  New Partner:  No  Possibility of Pregnancy:  No  Contraceptive type: IUD   ParaGard    Precipitating factors:   Recent Antibiotic Use: Yes: amoxicillin for GBS " "infection    Alleviating factors:     Therapies Tried and outcome:  Previous Episodes of Vaginitis:  Yes    LMP: No LMP recorded.      Patient Active Problem List   Diagnosis     Cervical high risk HPV (human papillomavirus) test positive     Past Medical History:   Diagnosis Date     Abnormal uterine bleeding      Cervical high risk HPV (human papillomavirus) test positive 12/21/2008 12/21/18 NIL pap, + HR HPV (not 16/18).  Plan: cotest in 1 year     Postpartum depression     Took Zoloft until 4/2011     Past Surgical History:   Procedure Laterality Date     DILATION AND CURETTAGE SUCTION  6/13/2012    Procedure: DILATION AND CURETTAGE SUCTION;  VACUUM DILATION AND CURETTAGE ;  Surgeon: Omega Maria MD;  Location: [facility]     MAMMOPLASTY REDUCTION BILATERAL      2003     Current Outpatient Medications   Medication Sig Dispense Refill     fluconazole (DIFLUCAN) 150 MG tablet Take 1 tablet (150 mg) by mouth once for 1 dose Take one tablet now, repeat dose in 3 days 2 tablet 0     Methylphenidate HCl (CONCERTA PO) Take 27 mg by mouth        PRENATAL VITAMINS PO Take 1 capsule by mouth.       sertraline (ZOLOFT) 50 MG tablet TAKE 1 TO 2 TABLETS BY MOUTH DAILY. 30 tablet 0     No Known Allergies    Health maintenance updated:  no    ROS:   12 point review of systems negative other than symptoms noted below.  Genitourinary: Vaginal Discharge    PHYSICAL EXAM:    /60   Ht 1.727 m (5' 8\")   Wt 65.3 kg (144 lb)   BMI 21.90 kg/m  , Body mass index is 21.9 kg/m .  General appearance:  healthy, alert and no distress  Pelvic Exam:  Vulva: No lesions, no adenopathy, BUS:  wnl.   Vagina: Moist, pink, large amount of thick white discharge consistent with yeast  well rugated, no lesions  Cervix:smooth, pink, no visible lesions. IUD strings visualized  Rectal exam: deferred    ASSESSMENT/PLAN:     ICD-10-CM    1. Encounter for removal of intrauterine contraceptive device Z30.432 REMOVE INTRAUTERINE DEVICE   2. " Vaginal discharge N89.8 Gram stain     Ureaplasma culture     Wet prep     Mycoplasma large colony culture     Group B strep PCR     fluconazole (DIFLUCAN) 150 MG tablet   3. Vaginal odor N89.8 Gram stain     Ureaplasma culture     Wet prep     Mycoplasma large colony culture     Group B strep PCR     fluconazole (DIFLUCAN) 150 MG tablet       Results for orders placed or performed in visit on 01/30/19   Wet prep   Result Value Ref Range    Specimen Description Vagina     Wet Prep No clue cells seen     Wet Prep No yeast seen     Wet Prep No Trichomonas seen      COUNSELING:  Orders placed for vaginal cultures to rule out all causes of vaginal discharge  Patient will be notified when results are available  RX sent for diflucan to pharmacy despite negative wet prep due to appearance and recent antibiotic use  If all cultures are negative she may wish to consider not replacing IUD and seeking a different form of contraception as it may be causing the excessive discharge that she finds bothersome  If she does wish to proceed with IUD replacement I would recommend she do it under ultrasound guidance as she has an arcuate fundus and ultrasound guidance can ensure proper placement against fundus, this will decrease risk of perforation and give patient peace of mind as previous US showed IUD placed properly only mm off of fundus and she still found it bothersome     30 minutes was spent face to face with the patient today discussing her history, diagnosis, and follow-up plan as noted above. Over 50% of the visit was spent in counseling and coordination of care.    Total Visit Time: 30 minutes.     JACKELYN Kitchen CNM

## 2019-01-31 LAB
GRAM STN SPEC: ABNORMAL
Lab: ABNORMAL
SPECIMEN SOURCE: ABNORMAL

## 2019-02-01 LAB
GP B STREP DNA SPEC QL NAA+PROBE: POSITIVE
SPECIMEN SOURCE: ABNORMAL

## 2019-02-01 NOTE — RESULT ENCOUNTER NOTE
Embre Christianson,    Your Group B Strep (GBS) test came back as positive. This means that you have GBS bacteria colonized around your vagina. This will not harm you, but in order to keep the baby health we will treat you with antibiotics (through an IV) when you are in labor.     The baby is exposed to the GBS after your water breaks, so if you suspect that your water broke (with or without contractions) please let us know.    If you have any questions, please let me know. Otherwise we can go over everything again at your next appointment.     Thanks,  Angeles Spencer, DNP, APRN, CNM

## 2019-02-04 LAB
BACTERIA SPEC CULT: ABNORMAL
SPECIMEN SOURCE: ABNORMAL

## 2019-02-06 LAB
BACTERIA SPEC CULT: NORMAL
BACTERIA SPEC CULT: NORMAL
Lab: NORMAL
Lab: NORMAL
SPECIMEN SOURCE: NORMAL
SPECIMEN SOURCE: NORMAL

## 2019-03-08 ENCOUNTER — HOSPITAL ENCOUNTER (EMERGENCY)
Facility: CLINIC | Age: 39
Discharge: HOME | End: 2019-03-08
Attending: EMERGENCY MEDICINE | Admitting: EMERGENCY MEDICINE
Payer: COMMERCIAL

## 2019-03-08 ENCOUNTER — APPOINTMENT (OUTPATIENT)
Dept: GENERAL RADIOLOGY | Facility: CLINIC | Age: 39
End: 2019-03-08
Attending: EMERGENCY MEDICINE
Payer: COMMERCIAL

## 2019-03-08 VITALS
SYSTOLIC BLOOD PRESSURE: 128 MMHG | TEMPERATURE: 98.5 F | DIASTOLIC BLOOD PRESSURE: 85 MMHG | HEIGHT: 68 IN | WEIGHT: 140 LBS | OXYGEN SATURATION: 98 % | BODY MASS INDEX: 21.22 KG/M2 | HEART RATE: 98 BPM | RESPIRATION RATE: 18 BRPM

## 2019-03-08 DIAGNOSIS — J10.1 INFLUENZA A: ICD-10-CM

## 2019-03-08 LAB
ALBUMIN SERPL-MCNC: 3.6 G/DL (ref 3.4–5)
ALP SERPL-CCNC: 77 U/L (ref 40–150)
ALT SERPL W P-5'-P-CCNC: 47 U/L (ref 0–50)
ANION GAP SERPL CALCULATED.3IONS-SCNC: 8 MMOL/L (ref 3–14)
AST SERPL W P-5'-P-CCNC: 40 U/L (ref 0–45)
BASOPHILS # BLD AUTO: 0 10E9/L (ref 0–0.2)
BASOPHILS NFR BLD AUTO: 0.2 %
BILIRUB SERPL-MCNC: 0.3 MG/DL (ref 0.2–1.3)
BUN SERPL-MCNC: 7 MG/DL (ref 7–30)
CALCIUM SERPL-MCNC: 8.3 MG/DL (ref 8.5–10.1)
CHLORIDE SERPL-SCNC: 107 MMOL/L (ref 94–109)
CO2 SERPL-SCNC: 24 MMOL/L (ref 20–32)
CREAT SERPL-MCNC: 0.61 MG/DL (ref 0.52–1.04)
DIFFERENTIAL METHOD BLD: NORMAL
EOSINOPHIL # BLD AUTO: 0 10E9/L (ref 0–0.7)
EOSINOPHIL NFR BLD AUTO: 0.2 %
ERYTHROCYTE [DISTWIDTH] IN BLOOD BY AUTOMATED COUNT: 11.9 % (ref 10–15)
GFR SERPL CREATININE-BSD FRML MDRD: >90 ML/MIN/{1.73_M2}
GLUCOSE SERPL-MCNC: 90 MG/DL (ref 70–99)
HCT VFR BLD AUTO: 37.3 % (ref 35–47)
HGB BLD-MCNC: 13.1 G/DL (ref 11.7–15.7)
IMM GRANULOCYTES # BLD: 0 10E9/L (ref 0–0.4)
IMM GRANULOCYTES NFR BLD: 0.2 %
LYMPHOCYTES # BLD AUTO: 1.3 10E9/L (ref 0.8–5.3)
LYMPHOCYTES NFR BLD AUTO: 25.7 %
MCH RBC QN AUTO: 32.9 PG (ref 26.5–33)
MCHC RBC AUTO-ENTMCNC: 35.1 G/DL (ref 31.5–36.5)
MCV RBC AUTO: 94 FL (ref 78–100)
MONOCYTES # BLD AUTO: 0.4 10E9/L (ref 0–1.3)
MONOCYTES NFR BLD AUTO: 8.9 %
NEUTROPHILS # BLD AUTO: 3.2 10E9/L (ref 1.6–8.3)
NEUTROPHILS NFR BLD AUTO: 64.8 %
NRBC # BLD AUTO: 0 10*3/UL
NRBC BLD AUTO-RTO: 0 /100
PLATELET # BLD AUTO: 212 10E9/L (ref 150–450)
POTASSIUM SERPL-SCNC: 3.6 MMOL/L (ref 3.4–5.3)
PROT SERPL-MCNC: 7.5 G/DL (ref 6.8–8.8)
RBC # BLD AUTO: 3.98 10E12/L (ref 3.8–5.2)
SODIUM SERPL-SCNC: 139 MMOL/L (ref 133–144)
WBC # BLD AUTO: 5 10E9/L (ref 4–11)

## 2019-03-08 PROCEDURE — 80053 COMPREHEN METABOLIC PANEL: CPT | Performed by: EMERGENCY MEDICINE

## 2019-03-08 PROCEDURE — 96360 HYDRATION IV INFUSION INIT: CPT

## 2019-03-08 PROCEDURE — 71046 X-RAY EXAM CHEST 2 VIEWS: CPT

## 2019-03-08 PROCEDURE — 94640 AIRWAY INHALATION TREATMENT: CPT

## 2019-03-08 PROCEDURE — 25000125 ZZHC RX 250: Performed by: EMERGENCY MEDICINE

## 2019-03-08 PROCEDURE — 25000128 H RX IP 250 OP 636: Performed by: EMERGENCY MEDICINE

## 2019-03-08 PROCEDURE — 99284 EMERGENCY DEPT VISIT MOD MDM: CPT | Mod: 25

## 2019-03-08 PROCEDURE — 85025 COMPLETE CBC W/AUTO DIFF WBC: CPT | Performed by: EMERGENCY MEDICINE

## 2019-03-08 RX ORDER — PREDNISONE 20 MG/1
TABLET ORAL
Qty: 10 TABLET | Refills: 0 | Status: SHIPPED | OUTPATIENT
Start: 2019-03-08 | End: 2020-09-28

## 2019-03-08 RX ORDER — CODEINE PHOSPHATE AND GUAIFENESIN 10; 100 MG/5ML; MG/5ML
1-2 SOLUTION ORAL EVERY 4 HOURS PRN
Qty: 240 ML | Refills: 0 | Status: SHIPPED | OUTPATIENT
Start: 2019-03-08 | End: 2020-01-17

## 2019-03-08 RX ORDER — IPRATROPIUM BROMIDE AND ALBUTEROL SULFATE 2.5; .5 MG/3ML; MG/3ML
3 SOLUTION RESPIRATORY (INHALATION) ONCE
Status: COMPLETED | OUTPATIENT
Start: 2019-03-08 | End: 2019-03-08

## 2019-03-08 RX ORDER — INHALER, ASSIST DEVICES
SPACER (EA) MISCELLANEOUS
Qty: 1 EACH | Refills: 0 | Status: SHIPPED | OUTPATIENT
Start: 2019-03-08 | End: 2020-01-17

## 2019-03-08 RX ADMIN — SODIUM CHLORIDE 1000 ML: 9 INJECTION, SOLUTION INTRAVENOUS at 15:30

## 2019-03-08 RX ADMIN — IPRATROPIUM BROMIDE AND ALBUTEROL SULFATE 3 ML: .5; 2.5 SOLUTION RESPIRATORY (INHALATION) at 15:33

## 2019-03-08 ASSESSMENT — ENCOUNTER SYMPTOMS
HEADACHES: 0
CONSTIPATION: 0
MYALGIAS: 1
ARTHRALGIAS: 1
DIARRHEA: 0
COUGH: 1
DYSURIA: 0

## 2019-03-08 ASSESSMENT — MIFFLIN-ST. JEOR: SCORE: 1363.54

## 2019-03-08 NOTE — ED PROVIDER NOTES
"  History     Chief Complaint:  Body aches    HPI   More Herrera is a 38 year old female who presents with concern for body aches. The patient reports that she had been feeling myalgias and arthralgias and coughing and fevers to 102 since one week ago, and five days ago an influenza resulted positive. They elected to not do tamiflu at that time, although she did get an inhaler to help with the persistent coughing. She states that she was improving over the last several days, and the inhaler had helped, however this morning she noticed an acute worsening of her body aches and cough and headache, though she does not have a headache now as her last dose of tylenol four hours ago was effective. She denies any fever today. She additionally denies bowel movement abnormalities, dysuria, or rash.     Allergies:  NKDA    Medications:    Zoloft  Concerta    Past Medical History:    Postpartum depression    Past Surgical History:    D&C   Mammoplasty reduction    Family History:    HLD  Breast cancer    Social History:  Negative for tobacco use.  Positive for alcohol use.     Review of Systems   Respiratory: Positive for cough.    Gastrointestinal: Negative for constipation and diarrhea.   Genitourinary: Negative for dysuria.   Musculoskeletal: Positive for arthralgias and myalgias.   Skin: Negative for rash.   Neurological: Negative for headaches.   All other systems reviewed and are negative.      Physical Exam     Patient Vitals for the past 24 hrs:   BP Temp Temp src Pulse Heart Rate Resp SpO2 Height Weight   03/08/19 1545 130/69 -- -- 98 -- -- 100 % -- --   03/08/19 1457 151/88 98.5  F (36.9  C) Temporal -- 89 16 95 % 1.727 m (5' 8\") 63.5 kg (140 lb)         Physical Exam  Constitutional: White female supine, coughing.   HENT: No signs of trauma. Oropharynx clear.   Eyes: EOM are normal. Pupils are equal, round, and reactive to light.   Neck: Normal range of motion. No JVD present. No cervical " adenopathy.  Cardiovascular: Regular rhythm.  Exam reveals no gallop and no friction rub.    No murmur heard.  Pulmonary/Chest: Bilateral breath sounds normal. No wheezes, rhonchi or rales.  Abdominal: Soft. No tenderness. No rebound or guarding.   Musculoskeletal: No edema. No tenderness.   Lymphadenopathy: No lymphadenopathy.   Neurological: Alert and oriented to person, place, and time. Normal strength. Coordination normal.   Skin: Skin is warm and dry. No rash noted. No erythema.     Emergency Department Course     Imaging:  Radiographic findings were communicated with the patient who voiced understanding of the findings.    XR Chest PA & LAT:   Heart size is normal. No pleural effusion, pneumothorax,  or abnormal area of consolidation. Nodular opacities overlying both  lower lobes, thought to be nipple shadows. as per radiology.     Laboratory:  CBC: WBC: 5.0, HGB: 13.1, PLT: 212  CMP:  Calcium: 8.3, o/w WNL (Creatinine: 0.61)    Interventions:    1530 NS 1L IV  1533 Duoneb 3 mL nebulization    Emergency Department Course:  Nursing notes and vitals reviewed. (1520) I performed an exam of the patient as documented above.     IV inserted. Medicine administered as documented above. Blood drawn. This was sent to the lab for further testing, results above.    The patient was sent for a CXR while in the emergency department, findings above.     (1637) I rechecked the patient and discussed the results of her workup thus far.     Findings and plan explained to the Patient. Patient discharged home with instructions regarding supportive care, medications, and reasons to return. The importance of close follow-up was reviewed. The patient was prescribed Robitussin, Deltasone.     I personally reviewed the laboratory results with the Patient and answered all related questions prior to discharge.       Impression & Plan      Medical Decision Making:  Mrs. Herrera is a 38 year old diagnosed last week with influenza and has  "continued coughing feeling lightheaded, feverish, and \"just doesn't feel right\" . On exam she continues to cough, her lungs though are clear. The rest of her exam is non focal. A CXR, CBC and Chemistry are unremarkable. She received a neb which has helped, as well as a liter of fluids. I will giver her a spacer to use with the albuterol inhaler she has, as well as a five day course of prednisone and robitussin cough medicine. She should make a follow up with her PMD next week as needed an recheck in the ED if symptoms worsen.     Diagnosis:    ICD-10-CM    1. Influenza A J10.1        Disposition:  discharged to home    Discharge Medications:     Medication List      Started    guaiFENesin-codeine 100-10 MG/5ML solution  Commonly known as:  ROBITUSSIN AC  1-2 tsp., Oral, EVERY 4 HOURS PRN     predniSONE 20 MG tablet  Commonly known as:  DELTASONE  Take two tablets (= 40mg) each day for 5 (five) days     spacer holding chamber  Use with inhaler        ASK your doctor about these medications    fluconazole 150 MG tablet  Commonly known as:  DIFLUCAN  150 mg, Oral, ONCE, Take one tablet now, repeat dose in 3 days  Ask about: Should I take this medication?     terconazole 0.8 % vaginal cream  Commonly known as:  TERAZOL 3  1 applicator, Vaginal, AT BEDTIME  Ask about: Should I take this medication?          Scribe Disclosure:  I, Van Healy, am serving as a scribe on 3/8/2019 at 3:14 PM to personally document services performed by Moise Cedeno MD based on my observations and the provider's statements to me.     Van Healy  3/8/2019    EMERGENCY DEPARTMENT       Moise Cedeno MD  03/08/19 7338    "

## 2019-03-08 NOTE — ED AVS SNAPSHOT
Emergency Department  [address] [facility] [address]  Phone:  [phone]  Fax:  [phone]                                    More Herrera   MRN: [id number]    Department:   Emergency Department   Date of Visit:  3/8/2019           After Visit Summary Signature Page    I have received my discharge instructions, and my questions have been answered. I have discussed any challenges I see with this plan with the nurse or doctor.    ..........................................................................................................................................  Patient/Patient Representative Signature      ..........................................................................................................................................  Patient Representative Print Name and Relationship to Patient    ..................................................               ................................................  Date                                   Time    ..........................................................................................................................................  Reviewed by Signature/Title    ...................................................              ..............................................  Date                                               Time          22EPIC Rev 08/18

## 2019-03-08 NOTE — ED NOTES
Pt reports breathing feels easier after neb treatment. . O2 sat 100%. Lung sounds clear except fine crackles left base.

## 2019-11-04 ENCOUNTER — HEALTH MAINTENANCE LETTER (OUTPATIENT)
Age: 39
End: 2019-11-04

## 2020-01-03 ENCOUNTER — TELEPHONE (OUTPATIENT)
Dept: OBGYN | Facility: CLINIC | Age: 40
End: 2020-01-03

## 2020-01-03 NOTE — TELEPHONE ENCOUNTER
Reason for call:  Other   Patient called regarding (reason for call): call back  Additional comments: Patient called to see if it was necessary to have a ultra-sound to insert an IUD? She has an appointment on 1/17/20 for a pap    Phone number to reach patient:  Cell number on file:    Telephone Information:   Mobile [phone]       Best Time:  anytime    Can we leave a detailed message on this number?  YES

## 2020-01-03 NOTE — TELEPHONE ENCOUNTER
Pt is past due for f/u pap smear.  [facility] clinic and schedule.    Melani Gonzalez  Pap Tracking

## 2020-01-15 NOTE — PROGRESS NOTES
More is a 39 year old  female who presents for annual exam.     Besides routine health maintenance, {NO OTHER:650923}.    HPI:  The patient's PCP is *** Omega Maria MD.  ***      GYNECOLOGIC HISTORY:    No LMP recorded.    Regular menses? { :185626}  Menses every *** days.  Length of menses: {NUMBERS 1-16:10} days    Her current contraception method is: {PLC CONTRACEPTION:502786}.  She  reports that she has never smoked. She has never used smokeless tobacco.  {Tobacco Cessation -- Delete if patient is a non-smoker:032895}  Patient {IS/IS NOT:9024} sexually active.  STD testing offered?  {GC/CHLAMYDIA:845323}  Last PHQ-9 score on record =   PHQ-9 SCORE 2017   PHQ-9 Total Score 8     Last GAD7 score on record =   ALYSSA-7 SCORE 2017   Total Score 7     Alcohol Score = ***    HEALTH MAINTENANCE:  Cholesterol: ?  Last Mammo: Not applicable, Result: Not applicable, Next Mammo: Due at age 40   Pap: DUE  Lab Results   Component Value Date    PAP NIL, HPV + other 2018      Colonoscopy:  NA, Result: Not applicable, Next Colonoscopy: Age 50  Dexa:  NA    Health maintenance updated:  {yes no:542050}    HISTORY:  OB History    Para Term  AB Living   3 3 2 0 0 2   SAB TAB Ectopic Multiple Live Births   0 0 0 0 2      # Outcome Date GA Lbr Milton/2nd Weight Sex Delivery Anes PTL Lv   3 Term 06/28/15 41w1d 02:10  02:10 3.79 kg (8 lb 5.7 oz) M Vag-Spont EPI  CECILY      Apgar1: 9  Apgar5: 9   2 Term / 40w4d 03:40 / 02:56 3.43 kg (7 lb 9 oz) F Vag-Spont   CECILY      Name: TOM COKER      Apgar1: 6  Apgar5: 8   1 Para 02/10/10    F Vag-Spont EPI         Patient Active Problem List   Diagnosis     Cervical high risk HPV (human papillomavirus) test positive     Past Surgical History:   Procedure Laterality Date     DILATION AND CURETTAGE SUCTION  2012    Procedure: DILATION AND CURETTAGE SUCTION;  VACUUM DILATION AND CURETTAGE ;  Surgeon: Omega Maria MD;  Location: [facility]      "MAMMOPLASTY REDUCTION BILATERAL      2003      Social History     Tobacco Use     Smoking status: Never Smoker     Smokeless tobacco: Never Used   Substance Use Topics     Alcohol use: Yes     Alcohol/week: 0.8 standard drinks     Types: 1 Glasses of wine per week     Comment: 1-2 drinks 2-3X per week      Problem (# of Occurrences) Relation (Name,Age of Onset)    Breast Cancer (2) Paternal Grandmother, Paternal Aunt: 4 paternal cousins at young age. Pt reports thery were BrCA gene neg.     Hyperlipidemia (3) Mother, Father, Brother            Current Outpatient Medications   Medication Sig     guaiFENesin-codeine (ROBITUSSIN AC) 100-10 MG/5ML solution Take 5-10 mLs by mouth every 4 hours as needed for cough     Methylphenidate HCl (CONCERTA PO) Take 27 mg by mouth      predniSONE (DELTASONE) 20 MG tablet Take two tablets (= 40mg) each day for 5 (five) days.     PRENATAL VITAMINS PO Take 1 capsule by mouth.     sertraline (ZOLOFT) 50 MG tablet TAKE 1 TO 2 TABLETS BY MOUTH DAILY.     spacer (Promedior ELICEO) holding chamber Use with inhaler     No current facility-administered medications for this visit.      No Known Allergies    Past medical, surgical, social and family histories were reviewed and updated in EPIC.    ROS:   {PLC ROSGYN:173759::\"12 point review of systems negative other than symptoms noted below or in the HPI.\"}  {ROS - :236173::\"No urinary frequency or dysuria, bladder or kidney problems\"}    EXAM:  There were no vitals taken for this visit.   BMI: There is no height or weight on file to calculate BMI.    PHYSICAL EXAM:  Constitutional:   Appearance: Well nourished, well developed, alert, in no acute distress  Neck:  Lymph Nodes:  No lymphadenopathy present    Thyroid:  Gland size normal, nontender, no nodules or masses present  on palpation  Chest:  Respiratory Effort:  Breathing unlabored  Cardiovascular:    Heart: Auscultation:  Regular rate, normal rhythm, no murmurs " "present  Breasts: {[facility] Breast exam:844661}  Gastrointestinal:   Abdominal Examination:  Abdomen nontender to palpation, tone normal without rigidity or guarding, no masses present, umbilicus without lesions   Liver and Spleen:  No hepatomegaly present, liver nontender to palpation    Hernias:  No hernias present  Lymphatic: Lymph Nodes:  No other lymphadenopathy present  Skin:  General Inspection:  No rashes present, no lesions present, no areas of  discoloration  Neurologic:    Mental Status:  Oriented X3.  Normal strength and tone, sensory exam                grossly normal, mentation intact and speech normal.    Psychiatric:   Mentation appears normal and affect normal/bright.         {[facility] Pelvic Exam:675850}    COUNSELING:   {FEMALE COUNSELING MESSAGES:705894::\"Reviewed preventive health counseling, as reflected in patient instructions\"}    BMI: There is no height or weight on file to calculate BMI.  {Obesity Action Plan -- Delete if BMA < 25:581941}    ASSESSMENT:  39 year old female with satisfactory annual exam.  No diagnosis found.    PLAN:  ***    Omega Maria MD  IUD Insertion:  CONSULT:    Is a pregnancy test required: {Pregnancy test required:662856}  Was a consent obtained?  {Yes/No:651261::\"Yes\"}    Subjective: More Herrera is a 39 year old  presents for IUD and desires {OB IUD TYPE:561874} type IUD.    Patient has been given the opportunity to ask questions about all forms of birth control, including all options appropriate for More Herrera. Discussed that no method of birth control, except abstinence is 100% effective against pregnancy or sexually transmitted infection.     More Herrera understands she may have the IUD removed at any time. IUD should be removed by a health care provider.    The entire insertion procedure was reviewed with the patient, including care after placement.    No LMP recorded. {last sex (Optional):096471}. No allergy to betadine or " "shellfish. {std screenin}  No results found for: HCG      There were no vitals taken for this visit.    Pelvic Exam:   EG/BUS: normal genital architecture without lesions, erythema or abnormal secretions.   Vagina: moist, pink, rugae with physiologic discharge and secretions  Cervix: ***parous no lesions and pink, moist, closed, without lesion or CMT  Uterus: ***position, mobile, no pain  Adnexa: within normal limits and no masses, nodularity, tenderness    PROCEDURE NOTE: -- IUD Insertion    Reason for Insertion: {IUD reasons:111155::\"contraception\"}    {IUD pain:865783}  Under sterile technique, cervix was visualized with speculum and prepped with {cleansing agents:113078::\"Betadine\"} solution swab x 3. {IUD instruments:211749::\"Tenaculum\"} was placed for stability. The uterus was gently straightened and sounded to {IUD SOUNDING DEPTHS:480541} cm. IUD prepared for placement, and IUD inserted according to 's instructions without difficulty or significant resitance, and deployed at the fundus. The strings were visualized and trimmed to {IUD SOUNDING DEPTHS:549209} cm from the external os. Tenaculum was removed and hemostasis noted. Speculum removed.  Patient tolerated procedure well.    Lot # ***  Exp: ***    EBL: minimal    Complications: none    ASSESSMENT: No diagnosis found.     PLAN:    Given 's handouts, including when to have IUD removed, list of danger s/sx, side effects and follow up recommended. Encouraged condom use for prevention of STD. Back up contraception advised for 7 days if progestin method. Advised to call for any fever, for prolonged or severe pain or bleeding, abnormal vaginal discharge, or unable to palpate strings. She was advised to use pain medications (ibuprofen) as needed for mild to moderate pain. Advised to follow-up in clinic in 4-6 weeks for IUD string check if unable to find strings or as directed by provider.     Omega Maria MD      "

## 2020-01-17 ENCOUNTER — OFFICE VISIT (OUTPATIENT)
Dept: OBGYN | Facility: CLINIC | Age: 40
End: 2020-01-17
Payer: COMMERCIAL

## 2020-01-17 VITALS
BODY MASS INDEX: 22.79 KG/M2 | HEART RATE: 82 BPM | DIASTOLIC BLOOD PRESSURE: 70 MMHG | HEIGHT: 68 IN | SYSTOLIC BLOOD PRESSURE: 122 MMHG | WEIGHT: 150.4 LBS

## 2020-01-17 DIAGNOSIS — Z13.1 SCREENING FOR DIABETES MELLITUS: ICD-10-CM

## 2020-01-17 DIAGNOSIS — Z23 NEED FOR PROPHYLACTIC VACCINATION AND INOCULATION AGAINST INFLUENZA: ICD-10-CM

## 2020-01-17 DIAGNOSIS — Z01.419 ENCOUNTER FOR GYNECOLOGICAL EXAMINATION WITHOUT ABNORMAL FINDING: Primary | ICD-10-CM

## 2020-01-17 DIAGNOSIS — Z13.220 ENCOUNTER FOR LIPID SCREENING FOR CARDIOVASCULAR DISEASE: ICD-10-CM

## 2020-01-17 DIAGNOSIS — Z13.6 ENCOUNTER FOR LIPID SCREENING FOR CARDIOVASCULAR DISEASE: ICD-10-CM

## 2020-01-17 PROCEDURE — 99395 PREV VISIT EST AGE 18-39: CPT | Mod: 25 | Performed by: OBSTETRICS & GYNECOLOGY

## 2020-01-17 PROCEDURE — G0145 SCR C/V CYTO,THINLAYER,RESCR: HCPCS | Performed by: OBSTETRICS & GYNECOLOGY

## 2020-01-17 PROCEDURE — 90471 IMMUNIZATION ADMIN: CPT | Performed by: OBSTETRICS & GYNECOLOGY

## 2020-01-17 PROCEDURE — 90686 IIV4 VACC NO PRSV 0.5 ML IM: CPT | Performed by: OBSTETRICS & GYNECOLOGY

## 2020-01-17 PROCEDURE — 87624 HPV HI-RISK TYP POOLED RSLT: CPT | Performed by: OBSTETRICS & GYNECOLOGY

## 2020-01-17 ASSESSMENT — PATIENT HEALTH QUESTIONNAIRE - PHQ9
5. POOR APPETITE OR OVEREATING: SEVERAL DAYS
SUM OF ALL RESPONSES TO PHQ QUESTIONS 1-9: 1

## 2020-01-17 ASSESSMENT — MIFFLIN-ST. JEOR: SCORE: 1405.71

## 2020-01-17 ASSESSMENT — ANXIETY QUESTIONNAIRES
2. NOT BEING ABLE TO STOP OR CONTROL WORRYING: SEVERAL DAYS
7. FEELING AFRAID AS IF SOMETHING AWFUL MIGHT HAPPEN: NOT AT ALL
GAD7 TOTAL SCORE: 5
1. FEELING NERVOUS, ANXIOUS, OR ON EDGE: SEVERAL DAYS
3. WORRYING TOO MUCH ABOUT DIFFERENT THINGS: SEVERAL DAYS
6. BECOMING EASILY ANNOYED OR IRRITABLE: SEVERAL DAYS
5. BEING SO RESTLESS THAT IT IS HARD TO SIT STILL: NOT AT ALL
IF YOU CHECKED OFF ANY PROBLEMS ON THIS QUESTIONNAIRE, HOW DIFFICULT HAVE THESE PROBLEMS MADE IT FOR YOU TO DO YOUR WORK, TAKE CARE OF THINGS AT HOME, OR GET ALONG WITH OTHER PEOPLE: NOT DIFFICULT AT ALL

## 2020-01-17 NOTE — PROGRESS NOTES
More is a 39 year old  female who presents for annual exam.     Besides routine health maintenance,  she would like to discuss  Reinsertion of IUD.    HPI:  The patient's PCP is Omega Maria MD.    Had IUD removed last year. Felt it was causing vaginal d/c.   Also felt it was in wrong position. Ultrasound confirmed fundal positioning.   Had IUD removed and pt not really sure if symptoms improved or not. Not sure if it was in her head or not.   Now wanting another insertion of Paragard.       GYNECOLOGIC HISTORY:    Patient's last menstrual period was 2020 (approximate).    Regular menses? yes  Menses every 28-30 days.  Length of menses: 4 days    Her current contraception method is: condoms.  She  reports that she has never smoked. She has never used smokeless tobacco.    Patient is sexually active.  STD testing offered?  Declined  Last PHQ-9 score on record =   PHQ-9 SCORE 2020   PHQ-9 Total Score 1     Last GAD7 score on record =   ALYSSA-7 SCORE 2020   Total Score 5     Alcohol Score = 0    HEALTH MAINTENANCE:  Cholesterol: Due  Last Mammo: Not applicable, Result: Not applicable, Next Mammo: Due at age 40   Pap: (  Lab Results   Component Value Date    PAP NIL 2018      Colonoscopy:  NA, Result: Not applicable, Next Colonoscopy: Age 50.  Dexa:  NA    Health maintenance updated:  no    HISTORY:  OB History    Para Term  AB Living   3 3 2 0 0 2   SAB TAB Ectopic Multiple Live Births   0 0 0 0 2      # Outcome Date GA Lbr Milton/2nd Weight Sex Delivery Anes PTL Lv   3 Term 06/28/15 41w1d 02:10  02:10 3.79 kg (8 lb 5.7 oz) M Vag-Spont EPI  CECILY      Apgar1: 9  Apgar5: 9   2 Term / 40w4d 03:40 / 02:56 3.43 kg (7 lb 9 oz) F Vag-Spont   CECILY      Name: CODIVINNIETOM MADRIGAL      Apgar1: 6  Apgar5: 8   1 Para 02/10/10    F Vag-Spont EPI         Patient Active Problem List   Diagnosis     Cervical high risk HPV (human papillomavirus) test positive     Past Surgical History:  "  Procedure Laterality Date     DILATION AND CURETTAGE SUCTION  6/13/2012    Procedure: DILATION AND CURETTAGE SUCTION;  VACUUM DILATION AND CURETTAGE ;  Surgeon: Omega Maria MD;  Location: [facility]     MAMMOPLASTY REDUCTION BILATERAL      2003      Social History     Tobacco Use     Smoking status: Never Smoker     Smokeless tobacco: Never Used   Substance Use Topics     Alcohol use: Yes     Alcohol/week: 0.8 standard drinks     Types: 1 Glasses of wine per week     Comment: 1-2 drinks 2-3X per week      Problem (# of Occurrences) Relation (Name,Age of Onset)    Breast Cancer (2) Paternal Grandmother, Paternal Aunt: 4 paternal cousins at young age. Pt reports thery were BrCA gene neg.     Hyperlipidemia (3) Mother, Father, Brother    No Known Problems (4) Sister, Maternal Grandmother, Maternal Grandfather, Other            Current Outpatient Medications   Medication Sig     Methylphenidate HCl (CONCERTA PO) Take 27 mg by mouth      PRENATAL VITAMINS PO Take 1 capsule by mouth.     predniSONE (DELTASONE) 20 MG tablet Take two tablets (= 40mg) each day for 5 (five) days.     No current facility-administered medications for this visit.      No Known Allergies    Past medical, surgical, social and family histories were reviewed and updated in EPIC.    ROS:   12 point review of systems negative other than symptoms noted below or in the HPI.      EXAM:  /70   Pulse 82   Ht 1.727 m (5' 8\")   Wt 68.2 kg (150 lb 6.4 oz)   LMP 01/03/2020 (Approximate)   BMI 22.87 kg/m     BMI: Body mass index is 22.87 kg/m .    PHYSICAL EXAM:  Constitutional:   Appearance: Well nourished, well developed, alert, in no acute distress  Neck:  Lymph Nodes:  No lymphadenopathy present    Thyroid:  Gland size normal, nontender, no nodules or masses present  on palpation  Chest:  Respiratory Effort:  Breathing unlabored  Cardiovascular:    Heart: Auscultation:  Regular rate, normal rhythm, no murmurs present  Breasts: Inspection " of Breasts:  No lymphadenopathy present., Palpation of Breasts and Axillae:  No masses present on palpation, no breast tenderness., Axillary Lymph Nodes:  No lymphadenopathy present. and No nodularity, asymmetry or nipple discharge bilaterally.  Gastrointestinal:   Abdominal Examination:  Abdomen nontender to palpation, tone normal without rigidity or guarding, no masses present, umbilicus without lesions   Liver and Spleen:  No hepatomegaly present, liver nontender to palpation    Hernias:  No hernias present  Lymphatic: Lymph Nodes:  No other lymphadenopathy present  Skin:  General Inspection:  No rashes present, no lesions present, no areas of  discoloration  Neurologic:    Mental Status:  Oriented X3.  Normal strength and tone, sensory exam                grossly normal, mentation intact and speech normal.    Psychiatric:   Mentation appears normal and affect normal/bright.         Pelvic Exam:  External Genitalia:     Normal appearance for age, no discharge present, no tenderness present, no inflammatory lesions present, color normal  Vagina:     Normal vaginal vault without central or paravaginal defects, no discharge present, no inflammatory lesions present, no masses present  Bladder:     Nontender to palpation  Urethra:   Urethral Body:  Urethra palpation normal, urethra structural support normal   Urethral Meatus:  No erythema or lesions present  Cervix:     Appearance healthy, no lesions present, nontender to palpation, no bleeding present  Uterus:     Uterus: firm, normal sized and nontender, anteverted in position.   Adnexa:     No adnexal tenderness present, no adnexal masses present  Perineum:     Perineum within normal limits, no evidence of trauma, no rashes or skin lesions present  Anus:     Anus within normal limits, no hemorrhoids present  Inguinal Lymph Nodes:     No lymphadenopathy present  Pubic Hair:     Normal pubic hair distribution for age  Genitalia and Groin:     No rashes present, no  lesions present, no areas of discoloration, no masses present      COUNSELING:   Reviewed preventive health counseling, as reflected in patient instructions       Regular exercise    BMI: Body mass index is 22.87 kg/m .      ASSESSMENT:  39 year old female with satisfactory annual exam.    ICD-10-CM    1. Encounter for gynecological examination without abnormal finding Z01.419 Pap imaged thin layer screen with HPV - recommended age 30 - 65     HPV High Risk Types DNA Cervical   2. Need for prophylactic vaccination and inoculation against influenza Z23 INFLUENZA VACCINE IM > 6 MONTHS VALENT IIV4 [56458]     Vaccine Administration, Initial [17550]       PLAN:  Will RTC for paragard insertion. Warned symptoms may return.   Should come fasting for labs.     Omega Maria MD

## 2020-01-18 ASSESSMENT — ANXIETY QUESTIONNAIRES: GAD7 TOTAL SCORE: 5

## 2020-01-22 NOTE — PROGRESS NOTES
"  IUD Insertion:  CONSULT:    Is a pregnancy test required: Yes.  Was it positive or negative?  Negative  Was a consent obtained?  Yes    Subjective: More Herrera is a 39 year old  presents for IUD and desires Paragard type IUD.    Patient has been given the opportunity to ask questions about all forms of birth control, including all options appropriate for More Herrera. Discussed that no method of birth control, except abstinence is 100% effective against pregnancy or sexually transmitted infection.     More Herrera understands she may have the IUD removed at any time. IUD should be removed by a health care provider.    The entire insertion procedure was reviewed with the patient, including care after placement.    Patient's last menstrual period was 2020 (approximate). Last sexual activity: Approx 1 week ago. No allergy to betadine or shellfish. Patient declines STD screening      /72   Pulse 68   Ht 1.727 m (5' 8\")   Wt 68.9 kg (152 lb)   LMP 2020 (Approximate)   BMI 23.11 kg/m      Pelvic Exam:   EG/BUS: normal genital architecture without lesions, erythema or abnormal secretions.   Vagina: moist, pink, rugae with physiologic discharge and secretions  Cervix: parous no lesions and pink, moist, closed, without lesion or CMT  Uterus: mid to anteverted position, mobile, no pain  Adnexa: within normal limits and no masses, nodularity, tenderness    PROCEDURE NOTE: -- IUD Insertion    Reason for Insertion: contraception    Under sterile technique, cervix was visualized with speculum and prepped with Betadine solution swab x 3. Tenaculum was placed for stability. The uterus was gently straightened and sounded to 7.0 cm. IUD prepared for placement, and IUD inserted according to 's instructions without difficulty or significant resitance, and deployed at the fundus. The strings were visualized and trimmed to 3.0 cm from the external os. Tenaculum was " removed and hemostasis noted. Speculum removed.  Patient tolerated procedure well.    NDC: [address]-7  LOT: [id number]  EXP: 07/2025    EBL: minimal    Complications: none    ASSESSMENT:     ICD-10-CM    1. Pre-procedure lab exam Z01.812 HCG Qual, Urine ([id number])   2. Encounter for insertion of intrauterine contraceptive device Z30.430 paragard intrauterine copper device     paragard intrauterine copper IUD device 1 each     INSERTION INTRAUTERINE DEVICE   3. Screening for diabetes mellitus Z13.1 Glucose   4. Encounter for lipid screening for cardiovascular disease Z13.220 Lipid Profile    Z13.6         PLAN:    Given 's handouts, including when to have IUD removed, list of danger s/sx, side effects and follow up recommended. Encouraged condom use for prevention of STD. Back up contraception advised for 7 days if progestin method. Advised to call for any fever, for prolonged or severe pain or bleeding, abnormal vaginal discharge, or unable to palpate strings. She was advised to use pain medications (ibuprofen) as needed for mild to moderate pain. Advised to follow-up in clinic in 4-6 weeks for IUD string check if unable to find strings or as directed by provider.     Omega Maria MD

## 2020-01-23 LAB
COPATH REPORT: NORMAL
PAP: NORMAL

## 2020-01-24 LAB
FINAL DIAGNOSIS: NORMAL
HPV HR 12 DNA CVX QL NAA+PROBE: NEGATIVE
HPV16 DNA SPEC QL NAA+PROBE: NEGATIVE
HPV18 DNA SPEC QL NAA+PROBE: NEGATIVE
SPECIMEN DESCRIPTION: NORMAL
SPECIMEN SOURCE CVX/VAG CYTO: NORMAL

## 2020-01-28 ENCOUNTER — OFFICE VISIT (OUTPATIENT)
Dept: OBGYN | Facility: CLINIC | Age: 40
End: 2020-01-28
Payer: COMMERCIAL

## 2020-01-28 VITALS
HEIGHT: 68 IN | HEART RATE: 68 BPM | WEIGHT: 152 LBS | BODY MASS INDEX: 23.04 KG/M2 | SYSTOLIC BLOOD PRESSURE: 108 MMHG | DIASTOLIC BLOOD PRESSURE: 72 MMHG

## 2020-01-28 DIAGNOSIS — Z30.430 ENCOUNTER FOR INSERTION OF INTRAUTERINE CONTRACEPTIVE DEVICE: ICD-10-CM

## 2020-01-28 DIAGNOSIS — Z13.6 ENCOUNTER FOR LIPID SCREENING FOR CARDIOVASCULAR DISEASE: ICD-10-CM

## 2020-01-28 DIAGNOSIS — Z01.812 PRE-PROCEDURE LAB EXAM: Primary | ICD-10-CM

## 2020-01-28 DIAGNOSIS — Z13.220 ENCOUNTER FOR LIPID SCREENING FOR CARDIOVASCULAR DISEASE: ICD-10-CM

## 2020-01-28 DIAGNOSIS — Z13.1 SCREENING FOR DIABETES MELLITUS: ICD-10-CM

## 2020-01-28 LAB
CHOLEST SERPL-MCNC: 196 MG/DL
GLUCOSE SERPL-MCNC: 89 MG/DL (ref 70–99)
HCG UR QL: NEGATIVE
HDLC SERPL-MCNC: 73 MG/DL
LDLC SERPL CALC-MCNC: 111 MG/DL
NONHDLC SERPL-MCNC: 123 MG/DL
TRIGL SERPL-MCNC: 58 MG/DL

## 2020-01-28 PROCEDURE — 80061 LIPID PANEL: CPT | Performed by: OBSTETRICS & GYNECOLOGY

## 2020-01-28 PROCEDURE — 58300 INSERT INTRAUTERINE DEVICE: CPT | Performed by: OBSTETRICS & GYNECOLOGY

## 2020-01-28 PROCEDURE — 82947 ASSAY GLUCOSE BLOOD QUANT: CPT | Performed by: OBSTETRICS & GYNECOLOGY

## 2020-01-28 PROCEDURE — 36415 COLL VENOUS BLD VENIPUNCTURE: CPT | Performed by: OBSTETRICS & GYNECOLOGY

## 2020-01-28 PROCEDURE — 81025 URINE PREGNANCY TEST: CPT | Performed by: OBSTETRICS & GYNECOLOGY

## 2020-01-28 RX ORDER — COPPER 313.4 MG/1
1 INTRAUTERINE DEVICE INTRAUTERINE ONCE
COMMUNITY

## 2020-01-28 RX ORDER — COPPER 313.4 MG/1
1 INTRAUTERINE DEVICE INTRAUTERINE ONCE
Status: COMPLETED
Start: 2020-01-28 | End: 2020-01-28

## 2020-01-28 RX ADMIN — COPPER 1 EACH: 313.4 INTRAUTERINE DEVICE INTRAUTERINE at 09:24

## 2020-01-28 ASSESSMENT — MIFFLIN-ST. JEOR: SCORE: 1412.97

## 2020-01-28 NOTE — NURSING NOTE
Clinic Administered Medication Documentation    MEDICATION LIST:   Intrauterine/Implant Insertion Documentation    Device was placed by provider (please see MAR for given by information). Please see MAR and medication order for additional information.     Type: Paragard  Remove/Replace by: 1/28/2030    Exp: 7/2025

## 2020-08-04 ENCOUNTER — MYC MEDICAL ADVICE (OUTPATIENT)
Dept: OBGYN | Facility: CLINIC | Age: 40
End: 2020-08-04

## 2020-09-28 ENCOUNTER — OFFICE VISIT (OUTPATIENT)
Dept: MIDWIFE SERVICES | Facility: CLINIC | Age: 40
End: 2020-09-28
Payer: COMMERCIAL

## 2020-09-28 VITALS — WEIGHT: 155 LBS | DIASTOLIC BLOOD PRESSURE: 78 MMHG | SYSTOLIC BLOOD PRESSURE: 128 MMHG | BODY MASS INDEX: 23.57 KG/M2

## 2020-09-28 DIAGNOSIS — Z97.5 IUD (INTRAUTERINE DEVICE) IN PLACE: Primary | ICD-10-CM

## 2020-09-28 PROCEDURE — 99212 OFFICE O/P EST SF 10 MIN: CPT | Performed by: ADVANCED PRACTICE MIDWIFE

## 2020-09-28 RX ORDER — SERTRALINE HYDROCHLORIDE 25 MG/1
25 TABLET, FILM COATED ORAL DAILY
COMMUNITY
End: 2024-02-12

## 2020-09-28 RX ORDER — ZOLPIDEM TARTRATE 10 MG/1
10 TABLET ORAL
COMMUNITY

## 2020-09-28 NOTE — PROGRESS NOTES
S: More Herrera   is a 40 year old  here for IUD check. Had Paragard IUD inserted 1/2020. She denies problems with the IUD other than irregular spotting since placement.  Sent My [employer] [address] message in early August regarding menses every 2 weeks for 1-2 months, was advised it could be due anovulatory cycles, stress or irregular bleeding from IUD itself and was advised she could make appointment or continue to monitor for a few months to see if cycle regulated. Today she is here for an IUD string check because she checked for her strings and noticed they seemed longer than usual. She just finished her cycle a few days ago. Does not routinely check for her string but has on occasion. She denies any ongoing irregular bleeding like she had in August. Denies cramping/abdominal pain.     O: /78   Wt 70.3 kg (155 lb)   LMP 09/22/2020   Breastfeeding No   BMI 23.57 kg/m    PELVIC EXAM:  Vulva: No external lesions, normal hair distribution, no adenopathy, BUS WNL  Vagina: Moist, pink, no abnormal discharge, well rugated, no lesions  Cervix: multiparous, smooth, pink, no visible lesions, neg CMT  IUD strings visualized and are extruding approximately 3.5 cm from cervical os, IUD body not visualized or felt in cervic. Bimanual exam performed, IUD body not felt.     ASSESSMENT:  1) IUD appears to be normally placed.     PLAN:    Discussed IUD appears appropriately placed, IUD strings visualized at appropriate length, IUD body not palpated on exam, reassured by normal menstrual cycle on Paragard and no new bleeding or cramping. If started to have new/irregular bleeding and/or abdominal pain and cramping she should be seen and possibly have US to confirm placement. RTC when due for annual exam or suspected problems with her IUD such as abnormal bleeding, disappearance of the strings or feeling the IUD in her cervix or with any pain with intercourse, or abnormal discharge. Reviewed s/sx of IUD expulsion.  If she starts  having increased cramping with menses,  ibuprofen 600-800mg po TID with food can be used.  If she decides that she wants to attempt pregnancy in the future, she will schedule an appointment to have the IUD removed.   Reviewed that the IUD will need to be replaced for reasons of effectiveness in 10 years.     JACKELYN Castro, CNM

## 2020-11-22 ENCOUNTER — HEALTH MAINTENANCE LETTER (OUTPATIENT)
Age: 40
End: 2020-11-22

## 2021-01-26 ENCOUNTER — IMMUNIZATION (OUTPATIENT)
Dept: NURSING | Facility: CLINIC | Age: 41
End: 2021-01-26
Payer: COMMERCIAL

## 2021-01-26 PROCEDURE — 0001A PR COVID VAC PFIZER DIL RECON 30 MCG/0.3 ML IM: CPT

## 2021-01-26 PROCEDURE — 91300 PR COVID VAC PFIZER DIL RECON 30 MCG/0.3 ML IM: CPT

## 2021-02-16 ENCOUNTER — IMMUNIZATION (OUTPATIENT)
Dept: NURSING | Facility: CLINIC | Age: 41
End: 2021-02-16
Attending: INTERNAL MEDICINE
Payer: COMMERCIAL

## 2021-02-16 PROCEDURE — 91300 PR COVID VAC PFIZER DIL RECON 30 MCG/0.3 ML IM: CPT

## 2021-02-16 PROCEDURE — 0002A PR COVID VAC PFIZER DIL RECON 30 MCG/0.3 ML IM: CPT

## 2021-04-04 ENCOUNTER — HEALTH MAINTENANCE LETTER (OUTPATIENT)
Age: 41
End: 2021-04-04

## 2021-07-22 ENCOUNTER — ANCILLARY PROCEDURE (OUTPATIENT)
Dept: MAMMOGRAPHY | Facility: CLINIC | Age: 41
End: 2021-07-22
Payer: COMMERCIAL

## 2021-07-22 DIAGNOSIS — Z12.31 VISIT FOR SCREENING MAMMOGRAM: ICD-10-CM

## 2021-07-22 PROCEDURE — 77067 SCR MAMMO BI INCL CAD: CPT | Mod: TC | Performed by: RADIOLOGY

## 2021-09-18 ENCOUNTER — HEALTH MAINTENANCE LETTER (OUTPATIENT)
Age: 41
End: 2021-09-18

## 2021-10-04 ENCOUNTER — TRANSFERRED RECORDS (OUTPATIENT)
Dept: HEALTH INFORMATION MANAGEMENT | Facility: CLINIC | Age: 41
End: 2021-10-04

## 2021-11-10 ENCOUNTER — IMMUNIZATION (OUTPATIENT)
Dept: NURSING | Facility: CLINIC | Age: 41
End: 2021-11-10
Payer: COMMERCIAL

## 2021-11-10 PROCEDURE — 0004A PR COVID VAC PFIZER DIL RECON 30 MCG/0.3 ML IM: CPT

## 2021-11-10 PROCEDURE — 91300 PR COVID VAC PFIZER DIL RECON 30 MCG/0.3 ML IM: CPT

## 2022-04-30 ENCOUNTER — HEALTH MAINTENANCE LETTER (OUTPATIENT)
Age: 42
End: 2022-04-30

## 2022-08-20 ENCOUNTER — HOSPITAL ENCOUNTER (EMERGENCY)
Facility: CLINIC | Age: 42
Discharge: HOME | End: 2022-08-20
Attending: EMERGENCY MEDICINE | Admitting: EMERGENCY MEDICINE
Payer: COMMERCIAL

## 2022-08-20 VITALS
TEMPERATURE: 98.5 F | HEIGHT: 68 IN | HEART RATE: 82 BPM | WEIGHT: 155 LBS | BODY MASS INDEX: 23.49 KG/M2 | RESPIRATION RATE: 18 BRPM | SYSTOLIC BLOOD PRESSURE: 138 MMHG | DIASTOLIC BLOOD PRESSURE: 94 MMHG | OXYGEN SATURATION: 97 %

## 2022-08-20 DIAGNOSIS — R22.0 LIP SWELLING: ICD-10-CM

## 2022-08-20 PROCEDURE — 99283 EMERGENCY DEPT VISIT LOW MDM: CPT

## 2022-08-20 RX ORDER — PREDNISONE 10 MG/1
50 TABLET ORAL DAILY
Qty: 25 TABLET | Refills: 0 | Status: SHIPPED | OUTPATIENT
Start: 2022-08-20 | End: 2022-08-25

## 2022-08-20 NOTE — DISCHARGE INSTRUCTIONS
I have placed a consult for Allergy and immunology.  Someone should contact you to help establish follow up.

## 2022-08-20 NOTE — ED PROVIDER NOTES
"  History     Chief Complaint:  Facial Swelling    The history is provided by the patient and the spouse.      More Herrera is a 41 year old female who presents to the emergency department for evaluation of facial swelling. The patients  reports she has one similar episode about 5 years ago. He notes that she woke up with a notably swollen bottom lip. At that time she was evaluated in the emergency department and treated with epinephrine and benadryl then observed for four hours. Since then she has no other episodes or clinical follow up. Then today the patient reports the onset sudden upper lip swelling and numbness. She called her , took 50 mg of benadryl, and headed to the emergency department by car. Here, she notes her symptoms have improved. She notes she ate sushi today, but this is not unusual for her. She does not endorses anything out of the ordinary or possible allergy exposure. She also does not note any family history of severe allergies.     Review of Systems   HENT:        Upper lip swelling (resolved)     Allergies:  The patient has no know allergies on file.     Medications:    Methylphenidate HCl (CONCERTA PO)  paragard intrauterine copper device  PRENATAL VITAMINS PO  sertraline (ZOLOFT) 25 MG tablet  zolpidem (AMBIEN) 10 MG tablet    Past Medical History:    Postpartum depression    Past Surgical History:    Dilation and curettage suction   Mammoplasty reduction bilateral      Family History:    Hyperlipidemia (mother, father, brother)     Social History:  Primary care provider: Omega Maria  The patient presents to the ED with her .     Physical Exam     Patient Vitals for the past 24 hrs:   BP Temp Temp src Pulse Resp SpO2 Height Weight   08/20/22 1747 (!) 147/111 98.5  F (36.9  C) Temporal 109 18 100 % 1.727 m (5' 8\") 70.3 kg (155 lb)     Physical Exam  Gen: well appearing, in no acute distress  Oral : Mucous membranes moist, uvula midline without " swelling  Nose: No rhinorhea  Ears: External near normal, without drainage  Eyes: periorbital tissues and sclera normal   Neck: supple, no abnormal swelling, no cervical lymphadenopathy  Lungs: Clear bilaterally, no tachypnea or distress, speaks full sentences  CV: Regular rate, regular rhythm  Skin: warm, dry, well perfused, no rashes/bruising/lesions on exposed skin  Neuro: alert, no gross motor or sensory deficits,   Psych: pleasant mood, normal affect    Emergency Department Course     Emergency Department Course:    Reviewed:  I reviewed nursing notes and vitals    Assessments:  1816 I obtained history and examined the patient as noted above.   1826 I rechecked the patient and explained findings.     Disposition:  The patient was discharged to home.     Impression & Plan      Medical Decision Making:  The patient presents with an episode up upper lip swelling that started early today. The upper lip felt a little numb and tingly as well. She showed me a picture it certainly was visibly swollen. She took some benadryl on the way in and it felt much better. She is with her  who reports a similar episode five years ago. She was seen in the emergency department. She received epinephrine. She has Epipens at home. This is only the second episode that has ever happened. She is not currently taking any ACE inhibitors. There is no family history. Discussing with her and her , they are looking for an allergy and immunology referral which I think is very reasonable. She may have some hereditary angioedema or this could be idiopathic. No know new allergens that she is aware of. She never had any swelling of the postior oropharynx or difficulty swallowing. She feels normal now and her exam is normal and benign. They would like to go home. I think this is reasonable. I will send her home with some prednisone in case it worsens. Her  in an emergency department physician. He feels comfortable with this plan  as well. Continue to use antihistamines.     Diagnosis:    ICD-10-CM    1. Lip swelling  R22.0 Adult Allergy/Asthma Referral     Discharge Medications:  New Prescriptions    PREDNISONE (DELTASONE) 10 MG TABLET    Take 5 tablets (50 mg) by mouth daily for 5 days     Scribe Disclosure:  BONNIE Alleybrenda Keith, am serving as a scribe at 6:16 PM on 8/20/2022 to document services personally performed by Abhilash Arteaga MD based on my observations and the provider's statements to me.      Abhilash Arteaga MD  08/20/22 8243

## 2022-08-20 NOTE — ED TRIAGE NOTES
Pt reports lip swelling that started at 1700.  States this resolved after she took 50 mg PO benadryl.  Denies known allergens.  No swelling noted in airway.     Triage Assessment     Row Name 08/20/22 0568       Triage Assessment (Adult)    Airway WDL WDL       Respiratory WDL    Respiratory WDL WDL       Cognitive/Neuro/Behavioral WDL    Cognitive/Neuro/Behavioral WDL WDL

## 2022-11-20 ENCOUNTER — HEALTH MAINTENANCE LETTER (OUTPATIENT)
Age: 42
End: 2022-11-20

## 2023-06-02 ENCOUNTER — HEALTH MAINTENANCE LETTER (OUTPATIENT)
Age: 43
End: 2023-06-02

## 2024-02-03 ENCOUNTER — HEALTH MAINTENANCE LETTER (OUTPATIENT)
Age: 44
End: 2024-02-03

## 2024-02-09 NOTE — PROGRESS NOTES
More is a 43 year old  female who presents for annual exam.     Besides routine health maintenance, she has no other health concerns today.    HPI:  Kimberly presents to our clinic for an annual exam. She has noted that her menses have been lighter recently. She is surprised that she is 15 pounds heavier than her baseline. She exercises and eats normally. She does endorse snoring at night and her mother has ESTELLE. 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 She is uncertain when she had her last period.       GYNECOLOGIC HISTORY:    No LMP recorded (lmp unknown). (Paragard IUD placed 2020).    Regular menses? yes  Menses every 30 days.  Length of menses: 5 days    Her current contraception method is: IUD.  She  reports that she has never smoked. She has never used smokeless tobacco.    Patient is sexually active.  STD testing offered?  Declined    Last PHQ-9 score on record =       2020     3:07 PM   PHQ-9 SCORE   PHQ-9 Total Score 1     Last GAD7 score on record =       2020     3:07 PM   ALYSSA-7 SCORE   Total Score 5     Alcohol Score = 3    HEALTH MAINTENANCE:    Overdue       Never done ADVANCE CARE PLANNING (Every 5 Years)     Never done HEPATITIS B IMMUNIZATION (1 of 3 - 3-dose series)     Never done HIV SCREENING (Once)     Never done HEPATITIS C SCREENING (Once)     2023 HPV TEST (Once)  Last completed:  PAP (Every 3 Years)  Last completed:  GLUCOSE (Every 3 Years)  Last completed:  MAMMO SCREENING (Every 2 Years)  Last completed: 2021     SEP 1   2023 INFLUENZA VACCINE (1)  Last completed: 2020     SEP 1   2023 COVID-19 Vaccine ( season)  Last completed: Nov 10, 2021        Upcoming       2025 LIPID (Every 5 Years)  Last completed: 2020     MAR 27   2025 DTAP/TDAP/TD IMMUNIZATION (3 - Td or  Tdap)  Last completed: Mar 27, 2015     Health maintenance updated:  due for mammo, patient states she will schedule.    HISTORY:  OB History    Para Term  AB Living   3 3 2 0 0 3   SAB IAB Ectopic Multiple Live Births   0 0 0 0 3      # Outcome Date GA Lbr [place]/2nd Weight Sex Delivery Anes PTL Lv   3 Term 06/28/15 41w1d 02:10  02:10 3.79 kg (8 lb 5.7 oz) M Vag-Spont EPI  CECILY      Apgar1: 9  Apgar5: 9   2 Term 12 40w4d 03:40  02:56 3.43 kg (7 lb 9 oz) F Vag-Spont   CECILY      Name: TOM COKER      Apgar1: 6  Apgar5: 8   1 Para 02/10/10    F Vag-Spont EPI  CECILY       Patient Active Problem List   Diagnosis    Cervical high risk HPV (human papillomavirus) test positive    IUD (intrauterine device) in place     Past Surgical History:   Procedure Laterality Date    DILATION AND CURETTAGE SUCTION  2012    Procedure: DILATION AND CURETTAGE SUCTION;  VACUUM DILATION AND CURETTAGE ;  Surgeon: Omega Maria MD;  Location: [facility]    MAMMOPLASTY REDUCTION BILATERAL            Social History     Tobacco Use    Smoking status: Never    Smokeless tobacco: Never   Substance Use Topics    Alcohol use: Yes     Alcohol/week: 0.8 standard drinks of alcohol     Types: 1 Glasses of wine per week     Comment: 1-2 drinks 2-3X per week      Problem (# of Occurrences) Relation (Name,Age of Onset)    Breast Cancer (2) Paternal Grandmother, Paternal Aunt: 4 paternal cousins at young age. Pt reports thery were BrCA gene neg.     Hyperlipidemia (3) Mother, Father, Brother    No Known Problems (4) Sister, Maternal Grandmother, Maternal Grandfather, Other              Current Outpatient Medications   Medication Sig    Methylphenidate HCl (CONCERTA PO) Take 27 mg by mouth     paragard intrauterine copper device 1 each by Intrauterine route once    PRENATAL VITAMINS PO Take 1 capsule by mouth.    sertraline (ZOLOFT) 50 MG tablet     zolpidem (AMBIEN) 10 MG tablet Take 10 mg by mouth nightly as needed for sleep  "    No current facility-administered medications for this visit.     No Known Allergies    Past medical, surgical, social and family histories were reviewed and updated in EPIC.    ROS:   12 point review of systems negative other than symptoms noted below or in the HPI.  No urinary frequency or dysuria, bladder or kidney problems    EXAM:  /60   Ht 1.727 m (5' 8\")   Wt 72.6 kg (160 lb)   LMP  (LMP Unknown)   BMI 24.33 kg/m     BMI: Body mass index is 24.33 kg/m .    PHYSICAL EXAM:  Constitutional:   Appearance: Well nourished, well developed, alert, in no acute distress  Neck:  Lymph Nodes:  No lymphadenopathy present    Thyroid:  Gland size normal, nontender, no nodules or masses present  on palpation  Chest:  Respiratory Effort:  Breathing unlabored  Cardiovascular:    Heart: Auscultation:  Regular rate, normal rhythm, no murmurs present  Breasts: Inspection of Breasts:  No lymphadenopathy present., Palpation of Breasts and Axillae:  No masses present on palpation, no breast tenderness., Axillary Lymph Nodes:  No lymphadenopathy present., No nodularity, asymmetry or nipple discharge bilaterally., and well healed scars.  Gastrointestinal:   Abdominal Examination:  Abdomen nontender to palpation, tone normal without rigidity or guarding, no masses present, umbilicus without lesions   Liver and Spleen:  No hepatomegaly present, liver nontender to palpation    Hernias:  No hernias present  Lymphatic: Lymph Nodes:  No other lymphadenopathy present  Skin:  General Inspection:  No rashes present, no lesions present, no areas of  discoloration  Neurologic:    Mental Status:  Oriented X3.  Normal strength and tone, sensory exam                grossly normal, mentation intact and speech normal.    Psychiatric:   Mentation appears normal and affect normal/bright.         Pelvic Exam:  External Genitalia:     Normal appearance for age, no discharge present, no tenderness present, no inflammatory lesions present, " color normal  Vagina:    Normal vaginal vault without central or paravaginal defects, no discharge present, no inflammatory lesions present, no masses present  Bladder:     Nontender to palpation  Urethra:   Urethral Body:  Urethra palpation normal, urethra structural support normal   Urethral Meatus:  No erythema or lesions present  Cervix:     Appearance healthy, no lesions present, nontender to palpation, no bleeding present, string present and about 5 cm long  Uterus:     Nontender to palpation, no masses present, position anteflexed, mobility: normal  Adnexa:     No adnexal tenderness present, no adnexal masses present  Perineum:     Perineum within normal limits, no evidence of trauma, no rashes or skin lesions present  Anus:     Anus within normal limits, no hemorrhoids present  Inguinal Lymph Nodes:     No lymphadenopathy present  Pubic Hair:     Normal pubic hair distribution for age  Genitalia and Groin:     No rashes present, no lesions present, no areas of discoloration, no masses present    COUNSELING:   Reviewed preventive health counseling, as reflected in patient instructions    BMI: Body mass index is 24.33 kg/m .      ASSESSMENT:  43 year old female with satisfactory annual exam.    ICD-10-CM    1. Encounter for gynecological examination without abnormal finding  Z01.419       2. Encounter for lipid screening for cardiovascular disease  Z13.220     Z13.6       3. Screening for metabolic disorder  Z13.228       4. Screening for thyroid disorder  Z13.29           PLAN:  Screening labs recommended. Will add thyroid tests as well. Will also check a CBC.  Weight is normal for her age and height but due to change we discussed  ways of increasing physical activity into her day.   IUD strings are long so I recommend an ultrasound. Emergency contraception prescribed as well.   I recommend a sleep study and this was accepted.   Mammogram ordered as well and encouraged to return for this.       Rosemarie PHILLIP  MD [facility]

## 2024-02-12 ENCOUNTER — OFFICE VISIT (OUTPATIENT)
Dept: OBGYN | Facility: CLINIC | Age: 44
End: 2024-02-12
Payer: COMMERCIAL

## 2024-02-12 VITALS
SYSTOLIC BLOOD PRESSURE: 108 MMHG | DIASTOLIC BLOOD PRESSURE: 60 MMHG | HEIGHT: 68 IN | WEIGHT: 160 LBS | BODY MASS INDEX: 24.25 KG/M2

## 2024-02-12 DIAGNOSIS — R06.83 SNORING: ICD-10-CM

## 2024-02-12 DIAGNOSIS — Z01.419 ENCOUNTER FOR GYNECOLOGICAL EXAMINATION WITHOUT ABNORMAL FINDING: Primary | ICD-10-CM

## 2024-02-12 DIAGNOSIS — Z13.0 SCREENING FOR DISORDER OF BLOOD AND BLOOD-FORMING ORGANS: ICD-10-CM

## 2024-02-12 DIAGNOSIS — Z12.31 VISIT FOR SCREENING MAMMOGRAM: ICD-10-CM

## 2024-02-12 DIAGNOSIS — Z13.6 ENCOUNTER FOR LIPID SCREENING FOR CARDIOVASCULAR DISEASE: ICD-10-CM

## 2024-02-12 DIAGNOSIS — Z13.1 SCREENING FOR DIABETES MELLITUS: ICD-10-CM

## 2024-02-12 DIAGNOSIS — Z30.431 IUD CHECK UP: ICD-10-CM

## 2024-02-12 DIAGNOSIS — Z13.228 SCREENING FOR METABOLIC DISORDER: ICD-10-CM

## 2024-02-12 DIAGNOSIS — Z13.29 SCREENING FOR THYROID DISORDER: ICD-10-CM

## 2024-02-12 DIAGNOSIS — Z13.220 ENCOUNTER FOR LIPID SCREENING FOR CARDIOVASCULAR DISEASE: ICD-10-CM

## 2024-02-12 DIAGNOSIS — Z30.012 ENCOUNTER FOR PRESCRIPTION OF EMERGENCY CONTRACEPTION: ICD-10-CM

## 2024-02-12 LAB
ALBUMIN SERPL BCG-MCNC: 4.7 G/DL (ref 3.5–5.2)
ALP SERPL-CCNC: 46 U/L (ref 40–150)
ALT SERPL W P-5'-P-CCNC: 12 U/L (ref 0–50)
ANION GAP SERPL CALCULATED.3IONS-SCNC: 13 MMOL/L (ref 7–15)
AST SERPL W P-5'-P-CCNC: 22 U/L (ref 0–45)
BILIRUB SERPL-MCNC: 0.4 MG/DL
BUN SERPL-MCNC: 9.2 MG/DL (ref 6–20)
CALCIUM SERPL-MCNC: 9.2 MG/DL (ref 8.6–10)
CHLORIDE SERPL-SCNC: 101 MMOL/L (ref 98–107)
CHOLEST SERPL-MCNC: 219 MG/DL
CREAT SERPL-MCNC: 0.65 MG/DL (ref 0.51–0.95)
DEPRECATED HCO3 PLAS-SCNC: 24 MMOL/L (ref 22–29)
EGFRCR SERPLBLD CKD-EPI 2021: >90 ML/MIN/1.73M2
ERYTHROCYTE [DISTWIDTH] IN BLOOD BY AUTOMATED COUNT: 11.2 % (ref 10–15)
FASTING STATUS PATIENT QL REPORTED: YES
GLUCOSE SERPL-MCNC: 97 MG/DL (ref 70–99)
HBA1C MFR BLD: 4.9 % (ref 0–5.6)
HCT VFR BLD AUTO: 40.1 % (ref 35–47)
HDLC SERPL-MCNC: 81 MG/DL
HGB BLD-MCNC: 13.7 G/DL (ref 11.7–15.7)
LDLC SERPL CALC-MCNC: 126 MG/DL
MCH RBC QN AUTO: 34.2 PG (ref 26.5–33)
MCHC RBC AUTO-ENTMCNC: 34.2 G/DL (ref 31.5–36.5)
MCV RBC AUTO: 100 FL (ref 78–100)
NONHDLC SERPL-MCNC: 138 MG/DL
PLATELET # BLD AUTO: 275 10E3/UL (ref 150–450)
POTASSIUM SERPL-SCNC: 4.2 MMOL/L (ref 3.4–5.3)
PROT SERPL-MCNC: 7.8 G/DL (ref 6.4–8.3)
RBC # BLD AUTO: 4.01 10E6/UL (ref 3.8–5.2)
SODIUM SERPL-SCNC: 138 MMOL/L (ref 135–145)
TRIGL SERPL-MCNC: 60 MG/DL
TSH SERPL DL<=0.005 MIU/L-ACNC: 1.34 UIU/ML (ref 0.3–4.2)
WBC # BLD AUTO: 4.4 10E3/UL (ref 4–11)

## 2024-02-12 PROCEDURE — 83036 HEMOGLOBIN GLYCOSYLATED A1C: CPT | Performed by: OBSTETRICS & GYNECOLOGY

## 2024-02-12 PROCEDURE — G0145 SCR C/V CYTO,THINLAYER,RESCR: HCPCS | Performed by: OBSTETRICS & GYNECOLOGY

## 2024-02-12 PROCEDURE — 84443 ASSAY THYROID STIM HORMONE: CPT | Performed by: OBSTETRICS & GYNECOLOGY

## 2024-02-12 PROCEDURE — 80061 LIPID PANEL: CPT | Performed by: OBSTETRICS & GYNECOLOGY

## 2024-02-12 PROCEDURE — 99386 PREV VISIT NEW AGE 40-64: CPT | Performed by: OBSTETRICS & GYNECOLOGY

## 2024-02-12 PROCEDURE — 85027 COMPLETE CBC AUTOMATED: CPT | Performed by: OBSTETRICS & GYNECOLOGY

## 2024-02-12 PROCEDURE — 36415 COLL VENOUS BLD VENIPUNCTURE: CPT | Performed by: OBSTETRICS & GYNECOLOGY

## 2024-02-12 PROCEDURE — 80053 COMPREHEN METABOLIC PANEL: CPT | Performed by: OBSTETRICS & GYNECOLOGY

## 2024-02-12 PROCEDURE — 87624 HPV HI-RISK TYP POOLED RSLT: CPT | Performed by: OBSTETRICS & GYNECOLOGY

## 2024-02-12 ASSESSMENT — PATIENT HEALTH QUESTIONNAIRE - PHQ9
5. POOR APPETITE OR OVEREATING: SEVERAL DAYS
SUM OF ALL RESPONSES TO PHQ QUESTIONS 1-9: 1

## 2024-02-12 ASSESSMENT — ANXIETY QUESTIONNAIRES
GAD7 TOTAL SCORE: 4
1. FEELING NERVOUS, ANXIOUS, OR ON EDGE: SEVERAL DAYS
6. BECOMING EASILY ANNOYED OR IRRITABLE: NOT AT ALL
5. BEING SO RESTLESS THAT IT IS HARD TO SIT STILL: NOT AT ALL
3. WORRYING TOO MUCH ABOUT DIFFERENT THINGS: SEVERAL DAYS
GAD7 TOTAL SCORE: 4
2. NOT BEING ABLE TO STOP OR CONTROL WORRYING: SEVERAL DAYS
7. FEELING AFRAID AS IF SOMETHING AWFUL MIGHT HAPPEN: NOT AT ALL
IF YOU CHECKED OFF ANY PROBLEMS ON THIS QUESTIONNAIRE, HOW DIFFICULT HAVE THESE PROBLEMS MADE IT FOR YOU TO DO YOUR WORK, TAKE CARE OF THINGS AT HOME, OR GET ALONG WITH OTHER PEOPLE: NOT DIFFICULT AT ALL

## 2024-02-15 LAB
BKR LAB AP GYN ADEQUACY: NORMAL
BKR LAB AP GYN INTERPRETATION: NORMAL
BKR LAB AP HPV REFLEX: NORMAL
BKR LAB AP PREVIOUS ABNORMAL: NORMAL
PATH REPORT.COMMENTS IMP SPEC: NORMAL
PATH REPORT.COMMENTS IMP SPEC: NORMAL
PATH REPORT.RELEVANT HX SPEC: NORMAL

## 2024-02-22 LAB
HUMAN PAPILLOMA VIRUS 16 DNA: NEGATIVE
HUMAN PAPILLOMA VIRUS 18 DNA: NEGATIVE
HUMAN PAPILLOMA VIRUS FINAL DIAGNOSIS: NORMAL
HUMAN PAPILLOMA VIRUS OTHER HR: NEGATIVE

## 2024-02-27 ENCOUNTER — ANCILLARY PROCEDURE (OUTPATIENT)
Dept: MAMMOGRAPHY | Facility: CLINIC | Age: 44
End: 2024-02-27
Payer: COMMERCIAL

## 2024-02-27 DIAGNOSIS — Z12.31 VISIT FOR SCREENING MAMMOGRAM: ICD-10-CM

## 2024-02-27 PROCEDURE — 77063 BREAST TOMOSYNTHESIS BI: CPT | Mod: TC | Performed by: RADIOLOGY

## 2024-02-27 PROCEDURE — 77067 SCR MAMMO BI INCL CAD: CPT | Mod: TC | Performed by: RADIOLOGY

## 2025-03-29 ENCOUNTER — HEALTH MAINTENANCE LETTER (OUTPATIENT)
Age: 45
End: 2025-03-29